# Patient Record
Sex: MALE | Race: WHITE | NOT HISPANIC OR LATINO | Employment: FULL TIME | URBAN - METROPOLITAN AREA
[De-identification: names, ages, dates, MRNs, and addresses within clinical notes are randomized per-mention and may not be internally consistent; named-entity substitution may affect disease eponyms.]

---

## 2017-01-24 ENCOUNTER — APPOINTMENT (OUTPATIENT)
Dept: LAB | Facility: CLINIC | Age: 32
End: 2017-01-24
Payer: COMMERCIAL

## 2017-01-24 ENCOUNTER — TRANSCRIBE ORDERS (OUTPATIENT)
Dept: LAB | Facility: CLINIC | Age: 32
End: 2017-01-24

## 2017-01-24 DIAGNOSIS — Z79.899 ENCOUNTER FOR LONG-TERM (CURRENT) USE OF OTHER MEDICATIONS: ICD-10-CM

## 2017-01-24 DIAGNOSIS — Z79.899 ENCOUNTER FOR LONG-TERM (CURRENT) USE OF OTHER MEDICATIONS: Primary | ICD-10-CM

## 2017-01-24 LAB
ALBUMIN SERPL BCP-MCNC: 3.9 G/DL (ref 3.5–5)
ALP SERPL-CCNC: 84 U/L (ref 46–116)
ALT SERPL W P-5'-P-CCNC: 40 U/L (ref 12–78)
AST SERPL W P-5'-P-CCNC: 15 U/L (ref 5–45)
BILIRUB DIRECT SERPL-MCNC: 0.1 MG/DL (ref 0–0.2)
BILIRUB SERPL-MCNC: 0.4 MG/DL (ref 0.2–1)
PROT SERPL-MCNC: 7 G/DL (ref 6.4–8.2)

## 2017-01-24 PROCEDURE — 80076 HEPATIC FUNCTION PANEL: CPT

## 2017-01-24 PROCEDURE — 36415 COLL VENOUS BLD VENIPUNCTURE: CPT

## 2017-03-02 DIAGNOSIS — M51.36 OTHER INTERVERTEBRAL DISC DEGENERATION, LUMBAR REGION: ICD-10-CM

## 2017-03-02 DIAGNOSIS — M54.50 LOW BACK PAIN: ICD-10-CM

## 2017-03-02 DIAGNOSIS — M51.26 OTHER INTERVERTEBRAL DISC DISPLACEMENT, LUMBAR REGION: ICD-10-CM

## 2017-03-21 ENCOUNTER — TRANSCRIBE ORDERS (OUTPATIENT)
Dept: ADMINISTRATIVE | Facility: HOSPITAL | Age: 32
End: 2017-03-21

## 2017-03-21 DIAGNOSIS — M54.5 LOW BACK PAIN, UNSPECIFIED BACK PAIN LATERALITY, UNSPECIFIED CHRONICITY, WITH SCIATICA PRESENCE UNSPECIFIED: Primary | ICD-10-CM

## 2017-03-21 DIAGNOSIS — M51.26 DISPLACEMENT OF LUMBAR INTERVERTEBRAL DISC WITHOUT MYELOPATHY: ICD-10-CM

## 2017-03-21 DIAGNOSIS — M51.36 DEGENERATION OF LUMBAR INTERVERTEBRAL DISC: ICD-10-CM

## 2017-04-06 ENCOUNTER — HOSPITAL ENCOUNTER (OUTPATIENT)
Dept: RADIOLOGY | Facility: HOSPITAL | Age: 32
Discharge: HOME | End: 2017-04-06
Attending: NEUROLOGICAL SURGERY | Admitting: RADIOLOGY
Payer: COMMERCIAL

## 2017-04-06 DIAGNOSIS — M51.36 OTHER INTERVERTEBRAL DISC DEGENERATION, LUMBAR REGION: ICD-10-CM

## 2017-04-06 DIAGNOSIS — M54.50 LOW BACK PAIN: ICD-10-CM

## 2017-04-06 DIAGNOSIS — M51.26 OTHER INTERVERTEBRAL DISC DISPLACEMENT, LUMBAR REGION: ICD-10-CM

## 2017-04-06 PROCEDURE — 64483 NJX AA&/STRD TFRM EPI L/S 1: CPT

## 2017-08-15 DIAGNOSIS — M51.26 OTHER INTERVERTEBRAL DISC DISPLACEMENT, LUMBAR REGION: ICD-10-CM

## 2017-09-14 ENCOUNTER — HOSPITAL ENCOUNTER (OUTPATIENT)
Dept: RADIOLOGY | Facility: HOSPITAL | Age: 32
Discharge: HOME | End: 2017-09-14
Attending: NEUROLOGICAL SURGERY
Payer: COMMERCIAL

## 2017-09-14 DIAGNOSIS — M51.26 DISPLACEMENT OF LUMBAR INTERVERTEBRAL DISC WITHOUT MYELOPATHY: ICD-10-CM

## 2017-09-14 DIAGNOSIS — M54.5 LOW BACK PAIN, UNSPECIFIED BACK PAIN LATERALITY, UNSPECIFIED CHRONICITY, WITH SCIATICA PRESENCE UNSPECIFIED: ICD-10-CM

## 2017-09-14 DIAGNOSIS — M51.36 DEGENERATION OF LUMBAR INTERVERTEBRAL DISC: ICD-10-CM

## 2017-09-14 PROCEDURE — 62323 NJX INTERLAMINAR LMBR/SAC: CPT

## 2017-09-14 RX ORDER — METHYLPREDNISOLONE ACETATE 80 MG/ML
80 INJECTION, SUSPENSION INTRA-ARTICULAR; INTRALESIONAL; INTRAMUSCULAR; SOFT TISSUE ONCE
Status: COMPLETED | OUTPATIENT
Start: 2017-09-14 | End: 2017-09-14

## 2017-09-14 RX ORDER — BUPIVACAINE HYDROCHLORIDE 2.5 MG/ML
2 INJECTION, SOLUTION EPIDURAL; INFILTRATION; INTRACAUDAL ONCE
Status: COMPLETED | OUTPATIENT
Start: 2017-09-14 | End: 2017-09-14

## 2017-09-14 RX ADMIN — METHYLPREDNISOLONE ACETATE 80 MG: 80 INJECTION, SUSPENSION INTRA-ARTICULAR; INTRALESIONAL; INTRAMUSCULAR; SOFT TISSUE at 11:50

## 2017-09-14 RX ADMIN — BUPIVACAINE HYDROCHLORIDE 2 ML: 2.5 INJECTION, SOLUTION EPIDURAL; INFILTRATION; INTRACAUDAL at 11:50

## 2017-12-19 DIAGNOSIS — M54.50 LOW BACK PAIN: ICD-10-CM

## 2017-12-19 DIAGNOSIS — M51.36 OTHER INTERVERTEBRAL DISC DEGENERATION, LUMBAR REGION: ICD-10-CM

## 2017-12-29 ENCOUNTER — GENERIC CONVERSION - ENCOUNTER (OUTPATIENT)
Dept: OTHER | Facility: OTHER | Age: 32
End: 2017-12-29

## 2018-01-02 ENCOUNTER — GENERIC CONVERSION - ENCOUNTER (OUTPATIENT)
Dept: NEUROSURGERY | Facility: MEDICAL CENTER | Age: 33
End: 2018-01-02

## 2018-01-02 ENCOUNTER — APPOINTMENT (OUTPATIENT)
Dept: PHYSICAL THERAPY | Facility: MEDICAL CENTER | Age: 33
End: 2018-01-02
Payer: COMMERCIAL

## 2018-01-02 PROCEDURE — G8990 OTHER PT/OT CURRENT STATUS: HCPCS

## 2018-01-02 PROCEDURE — 97161 PT EVAL LOW COMPLEX 20 MIN: CPT

## 2018-01-02 PROCEDURE — G8991 OTHER PT/OT GOAL STATUS: HCPCS

## 2018-01-04 ENCOUNTER — APPOINTMENT (OUTPATIENT)
Dept: PHYSICAL THERAPY | Facility: MEDICAL CENTER | Age: 33
End: 2018-01-04
Payer: COMMERCIAL

## 2018-01-04 PROCEDURE — 97140 MANUAL THERAPY 1/> REGIONS: CPT

## 2018-01-04 PROCEDURE — 97110 THERAPEUTIC EXERCISES: CPT

## 2018-01-16 ENCOUNTER — APPOINTMENT (OUTPATIENT)
Dept: PHYSICAL THERAPY | Facility: MEDICAL CENTER | Age: 33
End: 2018-01-16
Payer: COMMERCIAL

## 2018-01-16 PROCEDURE — 97140 MANUAL THERAPY 1/> REGIONS: CPT

## 2018-01-16 PROCEDURE — 97110 THERAPEUTIC EXERCISES: CPT

## 2018-01-18 ENCOUNTER — APPOINTMENT (OUTPATIENT)
Dept: PHYSICAL THERAPY | Facility: MEDICAL CENTER | Age: 33
End: 2018-01-18
Payer: COMMERCIAL

## 2018-01-18 PROCEDURE — 97110 THERAPEUTIC EXERCISES: CPT

## 2018-01-23 ENCOUNTER — APPOINTMENT (OUTPATIENT)
Dept: PHYSICAL THERAPY | Facility: MEDICAL CENTER | Age: 33
End: 2018-01-23
Payer: COMMERCIAL

## 2018-01-23 PROCEDURE — 97110 THERAPEUTIC EXERCISES: CPT

## 2018-01-23 PROCEDURE — 97140 MANUAL THERAPY 1/> REGIONS: CPT

## 2018-01-23 NOTE — MISCELLANEOUS
Message   Recorded as Task   Date: 12/28/2017 11:47 AM, Created By: Ottoniel Lugo   Task Name: Care Coordination   Assigned To: Ottoniel Lugo   Regarding Patient: Maxwell Juarez, Status: Active   Comment:    Betty Michael - 28 Dec 2017 11:47 AM     TASK CREATED   Betty Michael - 28 Dec 2017 11:51 [place] December EDITED  Dr Justin Hook called re: this pt and his symptoms  Spoke with pt directly who reports he was provided with a Medrol dose riccardo which was ineffective  He is experiencing increasing L leg numbness into thigh and weakness  Please advise, or to speak directly to him cell [phone]    Thank You   Nichol Conte - 29 Dec 2017 10:59 AM     TASK REPLIED TO: Previously Assigned To Kylie Carpenter  i have contact patient   Betty Michael - 29 Dec 2017 11:20 AM     TASK REASSIGNED: Previously Assigned To Rupal Soares   Electronically signed by : Bonita Dale, ; Dec 29 2017 11:20AM EST                       (Author)

## 2018-01-25 ENCOUNTER — APPOINTMENT (OUTPATIENT)
Dept: PHYSICAL THERAPY | Facility: MEDICAL CENTER | Age: 33
End: 2018-01-25
Payer: COMMERCIAL

## 2018-02-01 ENCOUNTER — APPOINTMENT (OUTPATIENT)
Dept: PHYSICAL THERAPY | Facility: MEDICAL CENTER | Age: 33
End: 2018-02-01
Payer: COMMERCIAL

## 2018-02-05 ENCOUNTER — OFFICE VISIT (OUTPATIENT)
Dept: PHYSICAL THERAPY | Facility: MEDICAL CENTER | Age: 33
End: 2018-02-05
Payer: COMMERCIAL

## 2018-02-05 DIAGNOSIS — G89.29 CHRONIC MIDLINE LOW BACK PAIN WITHOUT SCIATICA: Primary | ICD-10-CM

## 2018-02-05 DIAGNOSIS — M54.50 CHRONIC MIDLINE LOW BACK PAIN WITHOUT SCIATICA: Primary | ICD-10-CM

## 2018-02-05 PROCEDURE — 97110 THERAPEUTIC EXERCISES: CPT | Performed by: PHYSICAL THERAPIST

## 2018-02-05 PROCEDURE — 97140 MANUAL THERAPY 1/> REGIONS: CPT | Performed by: PHYSICAL THERAPIST

## 2018-02-05 NOTE — PROGRESS NOTES
Daily Note     Today's date: 2018  Patient name: Justina Gonzalez  : 1985  MRN: [id number]  Referring provider: Litzy Babin MD  Dx:   Encounter Diagnosis   Name Primary?  Chronic midline low back pain without sciatica Yes                  Subjective: patient states that overall he is seeing signs of improvement  He continues to have less discomfort while performing gym routine and work tasks  Patient notes that his stiffness persists  Objective: See treatment diary below  Precautions: none    Daily Treatment Diary     Manual  2/5                                                                                 Exercise Diary  2/5            Elliptical  10min            Hamstring stretch 30"x3            Hip flexor stretch 30"x3            Lunge matrix stretch 10sx3            Foam roller 10min                                                                                                                                                                                                                   Modalities                                                               Assessment: Tolerated treatment well  Patient demonstrated fatigue post treatment, exhibited good technique with therapeutic exercises and would benefit from continued PT  Focus on foam roller and manual therapy      Plan: Continue per plan of care

## 2018-02-08 ENCOUNTER — OFFICE VISIT (OUTPATIENT)
Dept: PHYSICAL THERAPY | Facility: MEDICAL CENTER | Age: 33
End: 2018-02-08
Payer: COMMERCIAL

## 2018-02-08 DIAGNOSIS — G89.29 CHRONIC MIDLINE LOW BACK PAIN WITHOUT SCIATICA: Primary | ICD-10-CM

## 2018-02-08 DIAGNOSIS — M54.50 CHRONIC MIDLINE LOW BACK PAIN WITHOUT SCIATICA: Primary | ICD-10-CM

## 2018-02-08 PROCEDURE — 97110 THERAPEUTIC EXERCISES: CPT | Performed by: PHYSICAL THERAPIST

## 2018-02-08 NOTE — PROGRESS NOTES
Daily Note     Today's date: 2018  Patient name: Letitia Gooden  : 1985  MRN: [id number]  Referring provider: Ken Corbin MD  Dx:   Encounter Diagnosis   Name Primary?  Chronic midline low back pain without sciatica Yes                  Subjective: patient states that overall he is seeing signs of improvement  He continues to have less discomfort while performing gym routine and work tasks  Patient notes that his stiffness persists and some days it is better than others  Objective: See treatment diary below  Precautions: none    Daily Treatment Diary     Manual                                                                                  Exercise Diary             Elliptical  10min 10m           Hamstring stretch 30"x3 30"x3           Hip flexor stretch 30"x3 30"x3           Lunge matrix stretch 10sx3 30"x3           Foam roller 10min 10           Lateral glide on wall   2x10           Prayer stretch  30"x3                                                                                                                                                                                        Modalities                                                               Assessment: Tolerated treatment well  Patient demonstrated fatigue post treatment, exhibited good technique with therapeutic exercises and would benefit from continued PT  Focus on foam roller and manual therapy      Plan: Continue per plan of care

## 2018-02-12 ENCOUNTER — OFFICE VISIT (OUTPATIENT)
Dept: PHYSICAL THERAPY | Facility: MEDICAL CENTER | Age: 33
End: 2018-02-12
Payer: COMMERCIAL

## 2018-02-12 DIAGNOSIS — M54.50 CHRONIC MIDLINE LOW BACK PAIN WITHOUT SCIATICA: Primary | ICD-10-CM

## 2018-02-12 DIAGNOSIS — G89.29 CHRONIC MIDLINE LOW BACK PAIN WITHOUT SCIATICA: Primary | ICD-10-CM

## 2018-02-12 PROCEDURE — 97140 MANUAL THERAPY 1/> REGIONS: CPT | Performed by: PHYSICAL THERAPIST

## 2018-02-12 PROCEDURE — 97110 THERAPEUTIC EXERCISES: CPT | Performed by: PHYSICAL THERAPIST

## 2018-02-12 NOTE — PROGRESS NOTES
Daily Note     Today's date: 2018  Patient name: Lima Coulter  : 1985  MRN: [id number]  Referring provider: Mone Pérez MD  Dx:   Encounter Diagnosis   Name Primary?  Chronic midline low back pain without sciatica Yes                  Subjective: patient states that overall he is seeing signs of improvement  He continues to have less discomfort while performing gym routine and work tasks  Patient notes that his stiffness persists and some days it is better than others but always in the morning  Objective: See treatment diary below  Precautions: none    Daily Treatment Diary     Manual                                                                                 Exercise Diary             Elliptical  10min 10m 10          Hamstring stretch 30"x3 30"x3           Hip flexor stretch 30"x3 30"x3 30"x3          Lunge matrix stretch 10sx3 30"x3 30"x3          Foam roller 10min 10 10          Lateral glide on wall   2x10 30"x3          Prayer stretch  30"x3 30"x3                                                                                                                                                                                       Modalities                                                               Assessment: Tolerated treatment well  Patient demonstrated fatigue post treatment, exhibited good technique with therapeutic exercises and would benefit from continued PT  Focus on foam roller and manual therapy      Plan: Continue per plan of care

## 2018-02-15 ENCOUNTER — APPOINTMENT (OUTPATIENT)
Dept: PHYSICAL THERAPY | Facility: MEDICAL CENTER | Age: 33
End: 2018-02-15
Payer: COMMERCIAL

## 2018-02-22 ENCOUNTER — OFFICE VISIT (OUTPATIENT)
Dept: PHYSICAL THERAPY | Facility: MEDICAL CENTER | Age: 33
End: 2018-02-22
Payer: COMMERCIAL

## 2018-02-22 DIAGNOSIS — G89.29 CHRONIC MIDLINE LOW BACK PAIN WITHOUT SCIATICA: Primary | ICD-10-CM

## 2018-02-22 DIAGNOSIS — M54.50 CHRONIC MIDLINE LOW BACK PAIN WITHOUT SCIATICA: Primary | ICD-10-CM

## 2018-02-22 PROCEDURE — 97110 THERAPEUTIC EXERCISES: CPT | Performed by: PHYSICAL THERAPIST

## 2018-02-22 NOTE — PROGRESS NOTES
Daily Note     Today's date: 2018  Patient name: Kervin Doe  : 1985  MRN: [id number]  Referring provider: Sandy Price MD  Dx:   Encounter Diagnosis   Name Primary?  Chronic midline low back pain without sciatica Yes                  Subjective: patient states that overall he is seeing signs of improvement  He continues to have less discomfort while performing gym routine and work tasks  Patient notes that his stiffness persists and some days it is better than others but always in the morning  Overall back to base line  Objective: See treatment diary below  Precautions: none    Daily Treatment Diary     Manual                                                                                Exercise Diary             Elliptical  10min 10m 10 10         Hamstring stretch 30"x3 30"x3  10x5         Hip flexor stretch 30"x3 30"x3 30"x3 10x5         Lunge matrix stretch 10sx3 30"x3 30"x3 10x5         Foam roller 10min 10 10 10         Lateral glide on wall   2x10 30"x3 10x5         Prayer stretch  30"x3 30"x3 10x5         Lumbar rotation stretch    10x5                                                                                                                                                                         Modalities                                                               Assessment: Tolerated treatment well  Patient demonstrated fatigue post treatment, exhibited good technique with therapeutic exercises and would benefit from continued PT  Focus on foam roller and manual therapy      Plan: Continue per plan of care

## 2018-02-27 ENCOUNTER — OFFICE VISIT (OUTPATIENT)
Dept: NEUROSURGERY | Facility: CLINIC | Age: 33
End: 2018-02-27
Payer: COMMERCIAL

## 2018-02-27 VITALS
BODY MASS INDEX: 28.29 KG/M2 | HEART RATE: 72 BPM | HEIGHT: 69 IN | DIASTOLIC BLOOD PRESSURE: 80 MMHG | RESPIRATION RATE: 16 BRPM | TEMPERATURE: 96.7 F | WEIGHT: 191 LBS | SYSTOLIC BLOOD PRESSURE: 130 MMHG

## 2018-02-27 DIAGNOSIS — M54.16 LUMBAR RADICULOPATHY: Primary | ICD-10-CM

## 2018-02-27 DIAGNOSIS — M51.9 LUMBAR DISC DISEASE: ICD-10-CM

## 2018-02-27 PROCEDURE — 99214 OFFICE O/P EST MOD 30 MIN: CPT | Performed by: NEUROLOGICAL SURGERY

## 2018-02-27 RX ORDER — DIPHENOXYLATE HYDROCHLORIDE AND ATROPINE SULFATE 2.5; .025 MG/1; MG/1
1 TABLET ORAL DAILY
COMMUNITY

## 2018-02-27 RX ORDER — NAPROXEN 500 MG/1
1 TABLET ORAL AS NEEDED
COMMUNITY
End: 2021-09-29

## 2018-02-27 RX ORDER — OMEPRAZOLE 40 MG/1
40 CAPSULE, DELAYED RELEASE ORAL DAILY
Refills: 3 | COMMUNITY
Start: 2018-01-05 | End: 2018-08-01 | Stop reason: CLARIF

## 2018-02-27 RX ORDER — CETIRIZINE HYDROCHLORIDE 10 MG/1
10 TABLET ORAL DAILY
COMMUNITY

## 2018-02-27 RX ORDER — ALBUTEROL SULFATE 90 UG/1
AEROSOL, METERED RESPIRATORY (INHALATION) AS NEEDED
COMMUNITY
End: 2020-02-06 | Stop reason: SDUPTHER

## 2018-02-27 NOTE — PROGRESS NOTES
Assessment/Plan:    No problem-specific Assessment & Plan notes found for this encounter  Problem List Items Addressed This Visit     None      Visit Diagnoses     Lumbar radiculopathy    -  Primary    Relevant Orders    MRI lumbar spine without contrast    Lumbar disc disease        Relevant Orders    MRI lumbar spine without contrast            Subjective:      Patient ID: Baron Kennedy is a 27 y o  male  HPI      Review of Systems   HENT: Negative  Eyes: Negative  Respiratory: Negative  Cardiovascular: Negative  Gastrointestinal: Negative  Endocrine: Negative  Genitourinary: Negative  Musculoskeletal: Positive for back pain (uncomfortable)  Skin: Negative  Allergic/Immunologic: Negative  Neurological: Positive for weakness (left leg), numbness (left leg) and headaches  Negative for dizziness, seizures and syncope  Hematological: Negative  Psychiatric/Behavioral: Positive for sleep disturbance (due to pain and numbness)  Negative for confusion  Objective: There were no vitals taken for this visit  HPI    Patient known to service  Chronic LBP with new leg pain x 6 months  PT, DYANA and nsaids of some benefit  No recent MRI x 9 yrs  Denies weakness, bowel or bladder deficit  Physical Exam      Moderate limitation in lumbar ROM  Severe paravertebral spasm  Full strength bilateral LE  Negative SLR  Intact sensation and DTR  Mildly antalgic gait  Intact fine motor and coordination    Radiology    No recent studies x 9 yrs  Known L4/5 left paracentral disc herniation with mild-moderate adjacent level degeneration    Summary and Plan    Mr Darlene Rebollar has acute on chronic back pain with new leg pain  We reviewed the conservative options including PT, DYANA and medications  He has been pursuing these avenues as prescribed for the past 6 months  Given his new symptoms, I have ordered a repeat MRI lumbar spine  I will see him in 6 weeks fu

## 2018-03-15 ENCOUNTER — HOSPITAL ENCOUNTER (OUTPATIENT)
Dept: RADIOLOGY | Facility: HOSPITAL | Age: 33
Discharge: HOME | End: 2018-03-15
Attending: NEUROLOGICAL SURGERY
Payer: COMMERCIAL

## 2018-03-15 DIAGNOSIS — M54.16 LUMBAR RADICULOPATHY: ICD-10-CM

## 2018-03-15 DIAGNOSIS — M51.9 LUMBAR DISC DISEASE: ICD-10-CM

## 2018-03-15 PROCEDURE — 72148 MRI LUMBAR SPINE W/O DYE: CPT

## 2018-08-01 ENCOUNTER — OFFICE VISIT (OUTPATIENT)
Dept: FAMILY MEDICINE CLINIC | Facility: CLINIC | Age: 33
End: 2018-08-01
Payer: COMMERCIAL

## 2018-08-01 ENCOUNTER — APPOINTMENT (OUTPATIENT)
Dept: LAB | Facility: CLINIC | Age: 33
End: 2018-08-01
Payer: COMMERCIAL

## 2018-08-01 ENCOUNTER — TELEPHONE (OUTPATIENT)
Dept: FAMILY MEDICINE CLINIC | Facility: CLINIC | Age: 33
End: 2018-08-01

## 2018-08-01 VITALS
BODY MASS INDEX: 27.88 KG/M2 | SYSTOLIC BLOOD PRESSURE: 110 MMHG | HEART RATE: 70 BPM | DIASTOLIC BLOOD PRESSURE: 70 MMHG | TEMPERATURE: 96.7 F | WEIGHT: 188.2 LBS | HEIGHT: 69 IN | RESPIRATION RATE: 16 BRPM

## 2018-08-01 DIAGNOSIS — L65.9 ALOPECIA: ICD-10-CM

## 2018-08-01 DIAGNOSIS — Z00.00 PHYSICAL EXAM: Primary | ICD-10-CM

## 2018-08-01 LAB
PSA SERPL-MCNC: 0.6 NG/ML (ref 0–4)
SL AMB  POCT GLUCOSE, UA: ABNORMAL
SL AMB LEUKOCYTE ESTERASE,UA: ABNORMAL
SL AMB POCT BILIRUBIN,UA: ABNORMAL
SL AMB POCT BLOOD,UA: ABNORMAL
SL AMB POCT CLARITY,UA: CLEAR
SL AMB POCT COLOR,UA: YELLOW
SL AMB POCT KETONES,UA: ABNORMAL
SL AMB POCT NITRITE,UA: ABNORMAL
SL AMB POCT PH,UA: 6.5
SL AMB POCT SPECIFIC GRAVITY,UA: 1.01
SL AMB POCT URINE PROTEIN: ABNORMAL
SL AMB POCT UROBILINOGEN: 0.2

## 2018-08-01 PROCEDURE — G0103 PSA SCREENING: HCPCS

## 2018-08-01 PROCEDURE — 36415 COLL VENOUS BLD VENIPUNCTURE: CPT

## 2018-08-01 PROCEDURE — 99385 PREV VISIT NEW AGE 18-39: CPT | Performed by: FAMILY MEDICINE

## 2018-08-01 PROCEDURE — 81002 URINALYSIS NONAUTO W/O SCOPE: CPT | Performed by: FAMILY MEDICINE

## 2018-08-01 RX ORDER — RANITIDINE 150 MG/1
150 TABLET ORAL 2 TIMES DAILY
COMMUNITY

## 2018-08-01 RX ORDER — FINASTERIDE 1 MG/1
1 TABLET, FILM COATED ORAL DAILY
Qty: 90 TABLET | Refills: 3 | Status: SHIPPED | OUTPATIENT
Start: 2018-08-01 | End: 2019-07-23 | Stop reason: SDUPTHER

## 2018-08-01 NOTE — PROGRESS NOTES
FAMILY PRACTICE OFFICE VISIT       NAME: Radha Omer  AGE: 27 y o  SEX: male       : 1985        MRN: [id number]    DATE: 2018  TIME: 12:55 PM    Assessment and Plan     Problem List Items Addressed This Visit     Physical exam - Primary    Relevant Orders    POCT urine dip (Completed)    Alopecia    Relevant Medications    finasteride (PROPECIA) 1 MG tablet    Other Relevant Orders    PSA, Total Screen (Completed)        Patient was given prescription to obtain PSA blood tests for baseline level  He was prescription for Propecia to initiate 1 milligram daily as directed  Overall patient appears to be in stable health    There are no Patient Instructions on file for this visit  Chief Complaint     Chief Complaint   Patient presents with   [address]     Patient is here to establish care  History of Present Illness     Patient is a nurse anesthetist for [employer]   He exercises 5 days a week  He is a nonsmoker  He denies any recent illness  Does not take any over-the-counter medications  He does have concerns of standing hair loss on the top of his head  There is no strong family history of hair loss  Patient wished to initiate Propecia medication  Patient did have blood test last year for insurance purposes  He will bring a copy of his blood test results which she states were all within normal limits        Review of Systems   Review of Systems   Constitutional: Negative  HENT: Negative  Respiratory: Negative  Cardiovascular: Negative  Gastrointestinal: Negative  Genitourinary: Negative  Musculoskeletal: Negative  Skin:        As per HPI   Neurological: Negative          Active Problem List     Patient Active Problem List   Diagnosis    Chronic low back pain    Disc degeneration, lumbar    Lumbar disc herniation    Physical exam    Alopecia       Past Medical History:  Past Medical History:   Diagnosis Date    Asthma     Esophageal reflux        Past Surgical History:  Past Surgical History:   Procedure Laterality Date    TOOTH EXTRACTION         Family History:  Family History   Problem Relation Age of Onset    No Known Problems Mother     Hyperlipidemia Father     Hypertension Father     Cancer Maternal Grandfather     Hypertension Paternal Grandmother     Hypertension Paternal Grandfather        Social History:  Social History     Social History    Marital status: Single     Spouse name: N/A    Number of children: N/A    Years of education: N/A     Occupational History    Not on file  Social History Main Topics    Smoking status: Never Smoker    Smokeless tobacco: Never Used    Alcohol use Yes      Comment: social    Drug use: No    Sexual activity: Yes     Other Topics Concern    Not on file     Social History Narrative    No narrative on file       Objective     Vitals:    08/01/18 0925   BP: 110/70   Pulse: 70   Resp: 16   Temp: (!) 96 7 °F (35 9 °C)     Wt Readings from Last 3 Encounters:   08/01/18 85 4 kg (188 lb 3 2 oz)   03/15/18 83 9 kg (185 lb)   02/27/18 86 6 kg (191 lb)       Physical Exam   Constitutional: He is oriented to person, place, and time  No distress  HENT:   Mouth/Throat: Oropharynx is clear and moist  No oropharyngeal exudate  Tympanic membranes within normal limits bilaterally   Neck:   No carotid bruits   Cardiovascular:   Regular rate and rhythm with no murmurs   Pulmonary/Chest:   Lungs are clear to auscultation without wheezes,rales, or rhonchi   Abdominal:   Abdomen is soft, nontender with positive bowel sounds  There is no rebound or guarding  No masses palpated   Musculoskeletal: He exhibits no edema  Lymphadenopathy:     He has no cervical adenopathy  Neurological: He is alert and oriented to person, place, and time  Skin:   Patient with thinning hair on the crown of his head    No rash noted on skin       Pertinent Laboratory/Diagnostic Studies:  Lab Results   Component Value Date    GLUCOSE 87 07/01/2015    BUN 20 07/01/2015    CREATININE 1 17 07/01/2015    CALCIUM 8 9 07/01/2015     07/01/2015    K 4 1 07/01/2015    CO2 31 07/01/2015     07/01/2015     Lab Results   Component Value Date    ALT 40 01/24/2017    AST 15 01/24/2017    ALKPHOS 84 01/24/2017    BILITOT 0 40 01/24/2017       Lab Results   Component Value Date    WBC 6 08 07/01/2015    HGB 14 6 07/01/2015    HCT 41 9 07/01/2015    MCV 85 07/01/2015     07/01/2015       No results found for: TSH    Lab Results   Component Value Date    CHOL 120 07/01/2015     Lab Results   Component Value Date    TRIG 71 07/01/2015     Lab Results   Component Value Date    HDL 42 07/01/2015     Lab Results   Component Value Date    LDLCALC 64 07/01/2015     No results found for: HGBA1C    Results for orders placed or performed in visit on 08/01/18   POCT urine dip   Result Value Ref Range    LEUKOCYTE ESTERASE,UA -      NITRITE,UA -     SL AMB POCT UROBILINOGEN 0 2     SL AMB POCT URINE PROTEIN -      PH,UA 6 5      BLOOD,UA -      SPECIFIC GRAVITY,UA 1 015      KETONES,UA -      BILIRUBIN,UA -     GLUCOSE, UA -      COLOR,UA Yellow      CLARITY,UA Clear        Orders Placed This Encounter   Procedures    PSA, Total Screen    POCT urine dip       ALLERGIES:  No Known Allergies    Current Medications     Current Outpatient Prescriptions   Medication Sig Dispense Refill    albuterol (PROVENTIL HFA,VENTOLIN HFA) 90 mcg/act inhaler Inhale as needed      cetirizine (ZYRTEC ALLERGY) 10 mg tablet Take 10 mg by mouth daily      multivitamin (THERAGRAN) TABS Take 1 tablet by mouth daily      naproxen (NAPROSYN) 500 mg tablet Take 1 tablet by mouth as needed      ranitidine (ZANTAC) 150 mg tablet Take 150 mg by mouth 2 (two) times a day      finasteride (PROPECIA) 1 MG tablet Take 1 tablet (1 mg total) by mouth daily 90 tablet 3     No current facility-administered medications for this visit            Health Maintenance Health Maintenance   Topic Date Due    Depression Screening PHQ-9  1985    PT PLAN OF CARE  1985    PNEUMOCOCCAL POLYSACCHARIDE VACCINE AGE 2-64 HIGH RISK  10/12/1985    DTaP,Tdap,and Td Vaccines (2 - Tdap) 10/12/2004    HIV SCREENING  07/01/2018    INFLUENZA VACCINE  09/01/2018     Immunization History   Administered Date(s) Administered    Meningococcal Polysaccharide (MPSV4) 07/17/2002    Td (adult), adsorbed 38/21/3971       Linda Zambrano MD

## 2018-12-07 ENCOUNTER — TELEPHONE (OUTPATIENT)
Dept: FAMILY MEDICINE CLINIC | Facility: CLINIC | Age: 33
End: 2018-12-07

## 2018-12-07 DIAGNOSIS — M51.26 LUMBAR DISC HERNIATION: Primary | ICD-10-CM

## 2018-12-07 DIAGNOSIS — M54.50 LOW BACK PAIN WITHOUT SCIATICA, UNSPECIFIED BACK PAIN LATERALITY, UNSPECIFIED CHRONICITY: Primary | ICD-10-CM

## 2018-12-07 RX ORDER — METHYLPREDNISOLONE 4 MG/1
TABLET ORAL
Qty: 21 TABLET | Refills: 0 | Status: SHIPPED | OUTPATIENT
Start: 2018-12-07 | End: 2018-12-19

## 2018-12-07 RX ORDER — CYCLOBENZAPRINE HCL 10 MG
10 TABLET ORAL 3 TIMES DAILY PRN
Qty: 30 TABLET | Refills: 0 | Status: SHIPPED | OUTPATIENT
Start: 2018-12-07 | End: 2018-12-19

## 2018-12-07 NOTE — TELEPHONE ENCOUNTER
He would need to be seen by someone either at our office, [place] , or any other [id number] [facility] before medication would be able to be prescribed

## 2018-12-07 NOTE — TELEPHONE ENCOUNTER
Eric Pratherira is having a flare up for his chronic back issue  Can you prescribe something or do you need to see him

## 2018-12-19 ENCOUNTER — CONSULT (OUTPATIENT)
Dept: PAIN MEDICINE | Facility: CLINIC | Age: 33
End: 2018-12-19
Payer: COMMERCIAL

## 2018-12-19 VITALS
WEIGHT: 189 LBS | RESPIRATION RATE: 18 BRPM | BODY MASS INDEX: 27.06 KG/M2 | DIASTOLIC BLOOD PRESSURE: 66 MMHG | HEIGHT: 70 IN | HEART RATE: 72 BPM | SYSTOLIC BLOOD PRESSURE: 112 MMHG

## 2018-12-19 DIAGNOSIS — M51.16 INTERVERTEBRAL DISC DISORDER WITH RADICULOPATHY OF LUMBAR REGION: Primary | ICD-10-CM

## 2018-12-19 DIAGNOSIS — M79.18 MYOFASCIAL PAIN SYNDROME: ICD-10-CM

## 2018-12-19 PROCEDURE — 99244 OFF/OP CNSLTJ NEW/EST MOD 40: CPT | Performed by: ANESTHESIOLOGY

## 2018-12-19 RX ORDER — METHOCARBAMOL 750 MG/1
750 TABLET, FILM COATED ORAL
Qty: 30 TABLET | Refills: 0 | Status: SHIPPED | OUTPATIENT
Start: 2018-12-19 | End: 2019-11-07 | Stop reason: ALTCHOICE

## 2018-12-19 NOTE — PROGRESS NOTES
Assessment:  1  Intervertebral disc disorder with radiculopathy of lumbar region    2  Myofascial pain syndrome        Plan:  The patient's symptoms, history/physical are consistent with pain that is multifactorial in origin but predominantly the result of his L4-5 disc herniation which is leading to right-sided radicular symptoms into his buttock and hip  At this time, I discussed repeating the epidural steroid injection that provided significant relief for him last year  He was again apprised of the most common risks and would like to proceed  He will be scheduled for right L4-5 transforaminal epidural steroid injection under fluoroscopic guidance  I will have him discontinue the cyclobenzaprine and start him on methocarbamol 750 mg at bedtime  He was apprised of the most common side effects including sleepiness and dizziness  Complete risks and benefits including bleeding, infection, tissue reaction, nerve injury and allergic reaction were discussed  The approach was demonstrated using models and literature was provided  Verbal and written consent was obtained  My impressions and treatment recommendations were discussed in detail with the patient who verbalized understanding and had no further questions  Discharge instructions were provided  I personally saw and examined the patient and I agree with the above discussed plan of care  Orders Placed This Encounter   Procedures    FL spine and pain procedure     Standing Status:   Future     Standing Expiration Date:   12/19/2022     Order Specific Question:   Reason for Exam:     Answer:   Right L4-5 TF DYANA     Order Specific Question:   Anticoagulant hold needed?      Answer:   No     New Medications Ordered This Visit   Medications    methocarbamol (ROBAXIN) 750 mg tablet     Sig: Take 1 tablet (750 mg total) by mouth daily at bedtime as needed for muscle spasms for up to 90 days     Dispense:  30 tablet     Refill:  0       History of Present Illness:    Alicia Hoyt is a 26 y o  male who presents for consultation in regards to lower back and right-sided hip pain  Symptoms have been present since 2008 following an athletic injury  Symptoms became recently aggravated about 2 weeks ago and he is currently reporting pain love is moderate to severe rated 8/10 on numeric rating scale and felt nearly constantly worse in the morning and at nighttime  Symptoms are described to be sharp, dull, aching and shooting in the lower back and flank areas into the hip  Pain is aggravated standing, bending, sitting, exercise, coughing, sneezing  There is no change with relaxation or bowel movements  Treatment history has included traction, physical therapy and exercise which has provided moderate relief  He has had epidural steroid injections performed in interventional Radiology which have provided moderate relief  His last injection was in September 2017 which provided significant relief  Use of a TENS unit has provided no relief  He is currently on Flexeril 10 mg at nighttime which provides minimal relief  He was placed on a Medrol Dosepak last week which provided minimal relief  I have personally reviewed and/or updated the patient's past medical history, past surgical history, family history, social history, current medications, allergies, and vital signs today  Review of Systems:    Review of Systems   Constitutional: Negative for fever and unexpected weight change  HENT: Negative for trouble swallowing  Eyes: Negative for visual disturbance  Respiratory: Negative for shortness of breath and wheezing  Cardiovascular: Negative for chest pain and palpitations  Gastrointestinal: Negative for constipation, diarrhea, nausea and vomiting  Endocrine: Negative for cold intolerance, heat intolerance and polydipsia  Genitourinary: Negative for difficulty urinating and frequency  Musculoskeletal: Positive for arthralgias   Negative for gait problem, joint swelling and myalgias  DROM   Skin: Negative for rash  Neurological: Negative for dizziness, seizures, syncope, weakness and headaches  Hematological: Does not bruise/bleed easily  Psychiatric/Behavioral: Negative for dysphoric mood  All other systems reviewed and are negative  Patient Active Problem List   Diagnosis    Chronic low back pain    Disc degeneration, lumbar    Lumbar disc herniation    Physical exam    Alopecia       Past Medical History:   Diagnosis Date    Asthma     Esophageal reflux        Past Surgical History:   Procedure Laterality Date    TOOTH EXTRACTION         Family History   Problem Relation Age of Onset    No Known Problems Mother     Hyperlipidemia Father     Hypertension Father     Cancer Maternal Grandfather     Hypertension Paternal Grandmother     Hypertension Paternal Grandfather        Social History     Occupational History    Not on file       Social History Main Topics    Smoking status: Never Smoker    Smokeless tobacco: Never Used    Alcohol use Yes      Comment: social    Drug use: No    Sexual activity: Yes       Current Outpatient Prescriptions on File Prior to Visit   Medication Sig    albuterol (PROVENTIL HFA,VENTOLIN HFA) 90 mcg/act inhaler Inhale as needed    cetirizine (ZYRTEC ALLERGY) 10 mg tablet Take 10 mg by mouth daily    finasteride (PROPECIA) 1 MG tablet Take 1 tablet (1 mg total) by mouth daily (Patient not taking: Reported on 12/19/2018 )    multivitamin (THERAGRAN) TABS Take 1 tablet by mouth daily    naproxen (NAPROSYN) 500 mg tablet Take 1 tablet by mouth as needed    ranitidine (ZANTAC) 150 mg tablet Take 150 mg by mouth 2 (two) times a day    [DISCONTINUED] cyclobenzaprine (FLEXERIL) 10 mg tablet Take 1 tablet (10 mg total) by mouth 3 (three) times a day as needed for muscle spasms    [DISCONTINUED] Methylprednisolone 4 MG TBPK Use as directed on package (Patient not taking: Reported on 12/19/2018 )     No current facility-administered medications on file prior to visit  No Known Allergies    Physical Exam:    /66 (BP Location: Left arm, Patient Position: Sitting)   Pulse 72   Resp 18   Ht 5' 10" (1 778 m)   Wt 85 7 kg (189 lb)   BMI 27 12 kg/m²     Constitutional: normal, well developed, well nourished, alert, in no distress and non-toxic and no overt pain behavior  Eyes: anicteric  HEENT: grossly intact  Neck: supple, symmetric, trachea midline and no masses   Pulmonary:even and unlabored  Cardiovascular:No edema or pitting edema present  Skin:Normal without rashes or lesions and well hydrated  Psychiatric:Mood and affect appropriate  Neurologic:Cranial Nerves II-XII grossly intact  Musculoskeletal:normal     Lumbar Spine Exam  Appearance:  Normal lordosis  Palpation/Tenderness:  right lumbar paraspinal tenderness  Sensory:  no sensory deficits noted  Range of Motion:  Flexion: Moderately limited  with pain  Extension:  No limitation  without pain  Lateral Flexion - Left:  No limitation  without pain  Lateral Flexion - Right:  Moderately limited  with pain  Rotation - Left:  No limitation  without pain  Rotation - Right:  Moderately limited  with pain  Motor Strength:  Left hip flexion:  5/5  Left hip extension:  5/5  Right hip flexion:  5/5  Right hip extension:  5/5  Left knee flexion:  5/5  Left knee extension:  5/5  Right knee flexion:  5/5  Right knee extension:  5/5  Left foot dorsiflexion:  5/5  Left foot plantar flexion:  5/5  Right foot dorsiflexion:  5/5  Right foot plantar flexion:  5/5  Reflexes:  Left Patellar:  2+   Right Patellar:  2+   Left Achilles:  2+   Right Achilles:  2+   Special Tests:  Left Straight Leg Test:  negative  Right Straight Leg Test:  positive  Left Boby's Maneuver:  negative  Right Boby's Maneuver:  negative    Imaging    Lumbar MRI dated 3/15/18:    L1-L2:  Normal      L2-L3:  Small left foraminal protrusion, new since prior study  Disc is in contact with left L2 root      L3-L4:  Moderate very mild acet arthrosis, increasing right foraminal protrusion, correlate for right L3 radiculitis      L4-L5:  Broad-based protrusion extending to the left, overtly stable in appearance  Minor narrowing of the left lateral recess, no evidence for L5 root compression     L5-S1:  Partial sacralization left transverse process    Minor facet arthrosis      IMPRESSION:     New small left L2-3 protrusion without definite root compression       Increasing right L3-4 foraminal protrusion correlate for right L3 radiculitis      Stable left posterolateral L4-5 protrusion, not clearly compressive

## 2018-12-21 ENCOUNTER — HOSPITAL ENCOUNTER (OUTPATIENT)
Dept: RADIOLOGY | Facility: CLINIC | Age: 33
Discharge: HOME | End: 2018-12-21
Attending: ANESTHESIOLOGY | Admitting: ANESTHESIOLOGY
Payer: COMMERCIAL

## 2018-12-21 VITALS
DIASTOLIC BLOOD PRESSURE: 77 MMHG | SYSTOLIC BLOOD PRESSURE: 129 MMHG | RESPIRATION RATE: 18 BRPM | OXYGEN SATURATION: 100 % | HEART RATE: 69 BPM | TEMPERATURE: 98.6 F

## 2018-12-21 DIAGNOSIS — M51.16 INTERVERTEBRAL DISC DISORDER WITH RADICULOPATHY OF LUMBAR REGION: ICD-10-CM

## 2018-12-21 PROCEDURE — 64484 NJX AA&/STRD TFRM EPI L/S EA: CPT | Performed by: ANESTHESIOLOGY

## 2018-12-21 PROCEDURE — 64483 NJX AA&/STRD TFRM EPI L/S 1: CPT | Performed by: ANESTHESIOLOGY

## 2018-12-21 RX ORDER — METHYLPREDNISOLONE ACETATE 80 MG/ML
80 INJECTION, SUSPENSION INTRA-ARTICULAR; INTRALESIONAL; INTRAMUSCULAR; PARENTERAL; SOFT TISSUE ONCE
Status: COMPLETED | OUTPATIENT
Start: 2018-12-21 | End: 2018-12-21

## 2018-12-21 RX ORDER — BUPIVACAINE HCL/PF 2.5 MG/ML
30 VIAL (ML) INJECTION ONCE
Status: COMPLETED | OUTPATIENT
Start: 2018-12-21 | End: 2018-12-21

## 2018-12-21 RX ORDER — 0.9 % SODIUM CHLORIDE 0.9 %
10 VIAL (ML) INJECTION ONCE
Status: COMPLETED | OUTPATIENT
Start: 2018-12-21 | End: 2018-12-21

## 2018-12-21 RX ADMIN — METHYLPREDNISOLONE ACETATE 80 MG: 80 INJECTION, SUSPENSION INTRA-ARTICULAR; INTRALESIONAL; INTRAMUSCULAR; PARENTERAL; SOFT TISSUE at 09:34

## 2018-12-21 RX ADMIN — Medication 4 ML: at 09:30

## 2018-12-21 RX ADMIN — SODIUM CHLORIDE 4 ML: 9 INJECTION, SOLUTION INTRAMUSCULAR; INTRAVENOUS; SUBCUTANEOUS at 09:30

## 2018-12-21 RX ADMIN — IOHEXOL 1 ML: 300 INJECTION, SOLUTION INTRAVENOUS at 09:34

## 2018-12-21 RX ADMIN — Medication 2 ML: at 09:34

## 2018-12-21 NOTE — H&P
History of Present Illness: The patient is a 26 y o  male who presents with complaints of right lower back and leg pain and is here today for right L4-5 transforaminal epidural steroid injection      Patient Active Problem List   Diagnosis    Chronic low back pain    Disc degeneration, lumbar    Lumbar disc herniation    Physical exam    Alopecia    Intervertebral disc disorder with radiculopathy of lumbar region       Past Medical History:   Diagnosis Date    Asthma     Esophageal reflux        Past Surgical History:   Procedure Laterality Date    TOOTH EXTRACTION           Current Outpatient Prescriptions:     albuterol (PROVENTIL HFA,VENTOLIN HFA) 90 mcg/act inhaler, Inhale as needed, Disp: , Rfl:     cetirizine (ZYRTEC ALLERGY) 10 mg tablet, Take 10 mg by mouth daily, Disp: , Rfl:     finasteride (PROPECIA) 1 MG tablet, Take 1 tablet (1 mg total) by mouth daily (Patient not taking: Reported on 12/19/2018 ), Disp: 90 tablet, Rfl: 3    methocarbamol (ROBAXIN) 750 mg tablet, Take 1 tablet (750 mg total) by mouth daily at bedtime as needed for muscle spasms for up to 90 days, Disp: 30 tablet, Rfl: 0    multivitamin (THERAGRAN) TABS, Take 1 tablet by mouth daily, Disp: , Rfl:     naproxen (NAPROSYN) 500 mg tablet, Take 1 tablet by mouth as needed, Disp: , Rfl:     ranitidine (ZANTAC) 150 mg tablet, Take 150 mg by mouth 2 (two) times a day, Disp: , Rfl:     Current Facility-Administered Medications:     bupivacaine (PF) (MARCAINE) 0 25 % injection 30 mL, 30 mL, Epidural, Once, Gracie Lala MD    iohexol (OMNIPAQUE) 300 mg/mL injection 50 mL, 50 mL, Epidural, Once, Gracie Lala MD    lidocaine (PF) (XYLOCAINE-MPF) 2 % injection 5 mL, 5 mL, Infiltration, Once, Gracie Lala MD    methylPREDNISolone acetate (DEPO-MEDROL) injection 80 mg, 80 mg, Epidural, Once, Gracie Lala MD    sodium chloride (PF) 0 9 % injection 10 mL, 10 mL, Infiltration, Once, Gracie Lala MD    No Known Allergies    Physical Exam:   Vitals:    12/21/18 0920   BP: 135/79   Pulse: 74   Resp: 20   Temp: 98 6 °F (37 °C)   SpO2: 100%     General: Awake, Alert, Oriented x 3, Mood and affect appropriate  Respiratory: Respirations even and unlabored  Cardiovascular: Peripheral pulses intact; no edema  Musculoskeletal Exam:   Right lower back tenderness    ASA Score: 1    Patient/Chart Verification  Patient ID Verified: Verbal  ID Band Applied: No  Consents Confirmed: Procedural, To be obtained in the Pre-Procedure area  H&P( within 30 days) Verified: To be obtained in the Pre-Procedure area  Allergies Reviewed: Yes  Anticoag/NSAID held?: No  Currently on antibiotics?: No    Assessment:   1   Intervertebral disc disorder with radiculopathy of lumbar region        Plan: Right L4-5 TF DYANA

## 2018-12-21 NOTE — DISCHARGE INSTR - LAB
Epidural Steroid Injection   WHAT YOU NEED TO KNOW:   An epidural steroid injection (DYANA) is a procedure to inject steroid medicine into the epidural space  The epidural space is between your spinal cord and vertebrae  Steroids reduce inflammation and fluid buildup in your spine that may be causing pain  You may be given pain medicine along with the steroids  ACTIVITY  · Do not drive or operate machinery today  · No strenuous activity today - bending, lifting, etc   · You may resume normal activites starting tomorrow - start slowly and as tolerated  · You may shower today, but no tub baths or hot tubs  · You may have numbness for several hours from the local anesthetic  Please use caution and common sense, especially with weight-bearing activities  CARE OF THE INJECTION SITE  · If you have soreness or pain, apply ice to the area today (20 minutes on/20 minutes off)  · Starting tomorrow, you may use warm, moist heat or ice if needed  · You may have an increase or change in your discomfort for 36-48 hours after your treatment  · Apply ice and continue with any pain medication you have been prescribed  · Notify the [facility] if you have any of the following: redness, drainage, swelling, headache, stiff neck or fever above 100°F     SPECIAL INSTRUCTIONS  · Our office will contact you in approximately 7 days for a progress report  MEDICATIONS  · Continue to take all routine medications  · Our office may have instructed you to hold some medications  If you have a problem specifically related to your procedure, please call our office at [phone]  Problems not related to your procedure should be directed to your primary care physician

## 2018-12-28 ENCOUNTER — TELEPHONE (OUTPATIENT)
Dept: PAIN MEDICINE | Facility: CLINIC | Age: 33
End: 2018-12-28

## 2019-06-13 DIAGNOSIS — M25.862 PATELLOFEMORAL DYSFUNCTION OF LEFT KNEE: Primary | ICD-10-CM

## 2019-06-24 ENCOUNTER — EVALUATION (OUTPATIENT)
Dept: PHYSICAL THERAPY | Facility: MEDICAL CENTER | Age: 34
End: 2019-06-24
Payer: COMMERCIAL

## 2019-06-24 DIAGNOSIS — M25.862 PATELLOFEMORAL DYSFUNCTION OF LEFT KNEE: Primary | ICD-10-CM

## 2019-06-24 PROCEDURE — 97112 NEUROMUSCULAR REEDUCATION: CPT | Performed by: PHYSICAL THERAPIST

## 2019-06-24 PROCEDURE — 97161 PT EVAL LOW COMPLEX 20 MIN: CPT | Performed by: PHYSICAL THERAPIST

## 2019-06-27 ENCOUNTER — APPOINTMENT (OUTPATIENT)
Dept: PHYSICAL THERAPY | Facility: MEDICAL CENTER | Age: 34
End: 2019-06-27
Payer: COMMERCIAL

## 2019-07-03 ENCOUNTER — OFFICE VISIT (OUTPATIENT)
Dept: PHYSICAL THERAPY | Facility: MEDICAL CENTER | Age: 34
End: 2019-07-03
Payer: COMMERCIAL

## 2019-07-03 DIAGNOSIS — M25.862 PATELLOFEMORAL DYSFUNCTION OF LEFT KNEE: Primary | ICD-10-CM

## 2019-07-03 PROCEDURE — 97112 NEUROMUSCULAR REEDUCATION: CPT | Performed by: PHYSICAL THERAPIST

## 2019-07-03 PROCEDURE — 97140 MANUAL THERAPY 1/> REGIONS: CPT | Performed by: PHYSICAL THERAPIST

## 2019-07-03 NOTE — PROGRESS NOTES
Daily Note     Today's date: 7/3/2019  Patient name: Nat Amezcua  : 1985  MRN: [id number]  Referring provider: Keli Krishnan MD  Dx:   Encounter Diagnosis     ICD-10-CM    1  Patellofemoral dysfunction of left knee M25 862                   Subjective: Patient reports that knee extensions are painful  Objective: See treatment diary below    Precautions: None      Manual  6/24 7/3           STM/MFR  AZ VMO/ ITB           Patellar mobs  AZ                                                      Exercise Diary              Quad sets 30x5s 30x5s           Gastroc str 3x30s 3x30s           Hamstring str 3x30s 3x30s           Quad str 3x30s 3x30s           Bridges  3x10 5s hold GR           Clams  30x GR           Hamstring curls  Prone 3x10 GR           TKE  Prone 30x 5s                                     Foam Roll  home                                                                                                                                                 Modalities              HPL             KT AZ                           Assessment:  Patient presents with soft tissue restrictions t/o VMO and ITB  Mild pain at medial plica and MPFL  Patient tolerated exercise progressions well without pain  Tolerated treatment well  Patient would benefit from continued PT    Plan: Continue per plan of care

## 2019-07-08 ENCOUNTER — APPOINTMENT (OUTPATIENT)
Dept: PHYSICAL THERAPY | Facility: MEDICAL CENTER | Age: 34
End: 2019-07-08
Payer: COMMERCIAL

## 2019-07-15 ENCOUNTER — OFFICE VISIT (OUTPATIENT)
Dept: PHYSICAL THERAPY | Facility: MEDICAL CENTER | Age: 34
End: 2019-07-15
Payer: COMMERCIAL

## 2019-07-15 DIAGNOSIS — M25.862 PATELLOFEMORAL DYSFUNCTION OF LEFT KNEE: Primary | ICD-10-CM

## 2019-07-15 PROCEDURE — 97110 THERAPEUTIC EXERCISES: CPT | Performed by: PHYSICAL THERAPIST

## 2019-07-15 PROCEDURE — 97112 NEUROMUSCULAR REEDUCATION: CPT | Performed by: PHYSICAL THERAPIST

## 2019-07-15 PROCEDURE — 97140 MANUAL THERAPY 1/> REGIONS: CPT | Performed by: PHYSICAL THERAPIST

## 2019-07-15 NOTE — PROGRESS NOTES
Daily Note     Today's date: 7/15/2019  Patient name: Bhumi Patel  : 1985  MRN: [id number]  Referring provider: Kate Irene MD  Dx:   Encounter Diagnosis     ICD-10-CM    1  Patellofemoral dysfunction of left knee M25 862                   Subjective: Patient reports that squatting is painful  Objective: See treatment diary below    Precautions: None      Manual  6/24 7/3 7/15          STM/IASTM  AZ VMO/ ITB AZ          Patellar mobs  AZ                                                      Exercise Diary              Quad sets 30x5s 30x5s 30x5s          Gastroc str 3x30s 3x30s 3x30s          Hamstring str 3x30s 3x30s 3x30s          Quad str 3x30s 3x30s 3x30s          Bridges  3x10 5s hold GR 3x10 5s hold blue          Clams  30x GR 30x blue          Hamstring curls  Prone 3x10 GR Prone 3x10 GR          TKE  Prone 30x 5s Prone 30x 5s          PB ham curls   10x b/l                       Foam Roll  home home                                                                                                                                                Modalities              HPL             KT AZ                           Assessment:  Patient presents with moderate soft tissue restrictions t/o VMO and ITB  Mild pain at medial plica and MPFL  He was unable to tolerate squatting to box d/t "burning" t/o patellar ligament  Patient tolerated exercise progressions well without pain  Tolerated treatment well  Patient would benefit from continued PT    Plan: Continue per plan of care

## 2019-07-19 ENCOUNTER — OFFICE VISIT (OUTPATIENT)
Dept: PHYSICAL THERAPY | Facility: MEDICAL CENTER | Age: 34
End: 2019-07-19
Payer: COMMERCIAL

## 2019-07-19 DIAGNOSIS — M25.862 PATELLOFEMORAL DYSFUNCTION OF LEFT KNEE: Primary | ICD-10-CM

## 2019-07-19 PROCEDURE — 97112 NEUROMUSCULAR REEDUCATION: CPT | Performed by: PHYSICAL THERAPIST

## 2019-07-19 PROCEDURE — 97140 MANUAL THERAPY 1/> REGIONS: CPT | Performed by: PHYSICAL THERAPIST

## 2019-07-19 PROCEDURE — 97110 THERAPEUTIC EXERCISES: CPT | Performed by: PHYSICAL THERAPIST

## 2019-07-19 NOTE — PROGRESS NOTES
Daily Note     Today's date: 2019  Patient name: Yamileth Vasquez  : 1985  MRN: [id number]  Referring provider: Shelley Horowitz MD  Dx:   Encounter Diagnosis     ICD-10-CM    1  Patellofemoral dysfunction of left knee M25 862                   Subjective: Patient reports that he is feeling stiff because he just woke up  He explained that whether he is sitting, standing, lying down, etc  there is pain when staying in one position and that moving around feels better  Objective: See treatment diary below    Precautions: None      Manual  6/24 7/3 7/15 7/19         STM/IASTM  AZ VMO/ ITB AZ          Patellar mobs  AZ                                                      Exercise Diary              Quad sets 30x5s 30x5s 30x5s 30x5s         Gastroc str 3x30s 3x30s 3x30s 3x30s         Hamstring str 3x30s 3x30s 3x30s 3x30s         Quad str 3x30s 3x30s 3x30s 3x30s         Bridges  3x10 5s hold GR 3x10 5s hold blue 3x10 5s hold blue         Clams  30x GR 30x blue 30x blue         Hamstring curls  Prone 3x10 GR Prone 3x10 GR Prone 3x10 GR         TKE  Prone 30x 5s Prone 30x 5s Prone 30x 5s         PB ham curls   10x b/l 5x b/l 10x single leg roll out                      Foam Roll  home home home         Lateral Step Down    2x10 4"                                                                                                                                  Modalities              HPL             KT AZ                           Assessment:  Patient still presents with moderate soft tissue restrictions t/o VMO and ITB  Patient continues to do well with exercise progression with no pain  Patient presents with quadriceps hamstring imbalance that is evident during plyoball hamstring exercise  Patient was able to perform lateral step down without pain, but noted unsteadiness  Tolerated treatment well  Patient would benefit from continued PT for optimal return to function  Plan: Continue per plan of care

## 2019-07-23 DIAGNOSIS — L65.9 ALOPECIA: ICD-10-CM

## 2019-07-23 RX ORDER — FINASTERIDE 1 MG/1
TABLET, FILM COATED ORAL
Qty: 90 TABLET | Refills: 0 | Status: SHIPPED | OUTPATIENT
Start: 2019-07-23 | End: 2019-11-11 | Stop reason: SDUPTHER

## 2019-08-12 ENCOUNTER — OFFICE VISIT (OUTPATIENT)
Dept: PHYSICAL THERAPY | Facility: MEDICAL CENTER | Age: 34
End: 2019-08-12
Payer: COMMERCIAL

## 2019-08-12 DIAGNOSIS — M25.862 PATELLOFEMORAL DYSFUNCTION OF LEFT KNEE: Primary | ICD-10-CM

## 2019-08-12 PROCEDURE — 97140 MANUAL THERAPY 1/> REGIONS: CPT | Performed by: PHYSICAL THERAPIST

## 2019-08-12 PROCEDURE — 97110 THERAPEUTIC EXERCISES: CPT | Performed by: PHYSICAL THERAPIST

## 2019-08-12 NOTE — PROGRESS NOTES
Daily Note     Today's date: 2019  Patient name: Jamison Pizano  : 1985  MRN: [id number]  Referring provider: Victorino Greene MD  Dx:   Encounter Diagnosis     ICD-10-CM    1  Patellofemoral dysfunction of left knee M25 862                   Subjective: Patient reports that he still has inferior patellar pain with squatting and when moving from sitting to standing  Objective: See treatment diary below    Precautions: None    Manual  6/24 7/3 7/15 7/19 8/12        STM/IASTM  AZ VMO/ ITB AZ          Patellar mobs  AZ   AZ                                                   Exercise Diary              Quad sets 30x5s 30x5s 30x5s 30x5s 30x5s        Gastroc str 3x30s 3x30s 3x30s 3x30s 3x30s        Hamstring str 3x30s 3x30s 3x30s 3x30s 3x30s        Quad str 3x30s 3x30s 3x30s 3x30s 3x30s        Bridges  3x10 5s hold GR 3x10 5s hold blue 3x10 5s hold blue 3x10 5s hold blue        Clams  30x GR 30x blue 30x blue 30x blue        Hamstring curls  Prone 3x10 GR Prone 3x10 GR Prone 3x10 GR Prone 3x10 GR        TKE  Prone 30x 5s Prone 30x 5s Prone 30x 5s Prone 30x 5s        PB ham curls   10x b/l 5x b/l 10x single leg roll out home                     Foam Roll  home home home home        Lateral Step Down    2x10 4"                                                                                                                                  Modalities              HPL     AZ        KT AZ                           Assessment:  Patient presents with moderate fat pad inflammation and medial patellar tenderness  HPL to decrease pain inflammation  Tolerated treatment well  Patient would benefit from continued PT for optimal return to function  Plan: Continue per plan of care

## 2019-08-14 ENCOUNTER — APPOINTMENT (OUTPATIENT)
Dept: PHYSICAL THERAPY | Facility: MEDICAL CENTER | Age: 34
End: 2019-08-14
Payer: COMMERCIAL

## 2019-08-22 ENCOUNTER — APPOINTMENT (OUTPATIENT)
Dept: RADIOLOGY | Facility: OTHER | Age: 34
End: 2019-08-22
Payer: COMMERCIAL

## 2019-08-22 ENCOUNTER — OFFICE VISIT (OUTPATIENT)
Dept: OBGYN CLINIC | Facility: OTHER | Age: 34
End: 2019-08-22
Payer: COMMERCIAL

## 2019-08-22 VITALS
SYSTOLIC BLOOD PRESSURE: 118 MMHG | BODY MASS INDEX: 26.94 KG/M2 | WEIGHT: 188.2 LBS | HEART RATE: 68 BPM | DIASTOLIC BLOOD PRESSURE: 70 MMHG | HEIGHT: 70 IN

## 2019-08-22 DIAGNOSIS — M23.92 INTERNAL DERANGEMENT OF LEFT KNEE: Primary | ICD-10-CM

## 2019-08-22 DIAGNOSIS — M25.562 LEFT KNEE PAIN, UNSPECIFIED CHRONICITY: ICD-10-CM

## 2019-08-22 DIAGNOSIS — M25.862 PATELLOFEMORAL DYSFUNCTION OF LEFT KNEE: ICD-10-CM

## 2019-08-22 PROCEDURE — 73562 X-RAY EXAM OF KNEE 3: CPT

## 2019-08-22 PROCEDURE — 99204 OFFICE O/P NEW MOD 45 MIN: CPT | Performed by: ORTHOPAEDIC SURGERY

## 2019-08-22 NOTE — PROGRESS NOTES
Assessment  Diagnoses and all orders for this visit:    Internal derangement of left knee    Patellofemoral dysfunction left knee        Discussion and Plan:    · Patient has tried and failed formal physical therapy and home exercise program for the past 5 weeks for his left knee symptoms  On physical exam today there is a concern for possible meniscal pathology  · Next step in treatment process will be to order an MRI of his left knee to evaluate for internal derangement with focus on the meniscus  · May perform activities as tolerated  Avoid painful movements  · He will follow up after MRI for discussion result and treatment options based on these results  Subjective:   Patient ID: Yancy Niece is a 26 y o  male      The patient presents with a chief complaint of left knee pain  The pain began 3 month(s) ago and is not associated with an acute injury  Patient states that he began to feel the pain after his soccer season but had no acute injury  The patient describes the pain as aching and sharp and 4 out of 10 in intensity  It is intermittent, occurring with increasing frequency in timing, and localizes the pain to the anterior medial joint line, anterior lateral joint line, patellar tendon  The pain is worse with activities, running and squatting and relieved with rest, ice, avoiding painful activities  He reports mechanical symptoms such as locking and catching  He denies instability of the knee  Patient has had treatment in the form of formal physical therapy and with home exercise program for the past 5 weeks without benefit  The following portions of the patient's history were reviewed and updated as appropriate: allergies, current medications, past family history, past medical history, past social history, past surgical history and problem list     Review of Systems   Constitutional: Negative for chills, fatigue, fever and unexpected weight change     HENT: Negative for hearing loss, nosebleeds and sore throat  Eyes: Negative for pain, redness and visual disturbance  Respiratory: Negative for cough, shortness of breath and wheezing  Cardiovascular: Negative for chest pain, palpitations and leg swelling  Gastrointestinal: Negative for abdominal pain, nausea and vomiting  Endocrine: Negative for polydipsia and polyuria  Genitourinary: Negative for frequency and urgency  Skin: Negative for color change, rash and wound  Neurological: Negative for dizziness, weakness, numbness and headaches  Psychiatric/Behavioral: Negative for behavioral problems, self-injury and suicidal ideas  Objective:  Left Knee Exam     Tenderness   The patient is experiencing tenderness in the lateral joint line and medial joint line  Range of Motion   The patient has normal left knee ROM  Tests   Varus: negative Valgus: negative  Lachman:  Anterior - negative      Drawer:  Anterior - negative         Other   Erythema: absent  Sensation: normal  Pulse: present  Effusion: no effusion present    Comments:  Equivocal Izzy's test over the medial joint line            Physical Exam   Constitutional: He is oriented to person, place, and time  He appears well-developed and well-nourished  No distress  Eyes: Pupils are equal, round, and reactive to light  Conjunctivae are normal    Neck: Normal range of motion  Neck supple  Cardiovascular: Normal rate, regular rhythm and intact distal pulses  Pulmonary/Chest: Effort normal and breath sounds normal    Abdominal: Soft  Bowel sounds are normal    Musculoskeletal:        Left knee: He exhibits no effusion  Neurological: He is alert and oriented to person, place, and time  He has normal reflexes  Skin: Skin is warm and dry  No rash noted  No erythema  Psychiatric: He has a normal mood and affect  His behavior is normal          I have personally reviewed pertinent films in PACS and my interpretation is as follows      X-rays left knee performed on 08/22/2018:  Evidence of mild patella samuel with slightly shallow trochlear groove  No other acute osseous abnormality or degenerative changes      Scribe Attestation    I,:   Steve Weeks am acting as a scribe while in the presence of the attending physician :        I,:   Joss Serna MD personally performed the services described in this documentation    as scribed in my presence :

## 2019-08-28 ENCOUNTER — TELEPHONE (OUTPATIENT)
Dept: OBGYN CLINIC | Facility: HOSPITAL | Age: 34
End: 2019-08-28

## 2019-08-29 ENCOUNTER — HOSPITAL ENCOUNTER (OUTPATIENT)
Dept: MRI IMAGING | Facility: HOSPITAL | Age: 34
Discharge: HOME | End: 2019-08-29
Attending: ORTHOPAEDIC SURGERY
Payer: COMMERCIAL

## 2019-08-29 DIAGNOSIS — M25.862 PATELLOFEMORAL DYSFUNCTION OF LEFT KNEE: ICD-10-CM

## 2019-08-29 DIAGNOSIS — M23.92 INTERNAL DERANGEMENT OF LEFT KNEE: ICD-10-CM

## 2019-08-29 PROCEDURE — 73721 MRI JNT OF LWR EXTRE W/O DYE: CPT

## 2019-09-09 ENCOUNTER — OFFICE VISIT (OUTPATIENT)
Dept: PHYSICAL THERAPY | Facility: MEDICAL CENTER | Age: 34
End: 2019-09-09
Payer: COMMERCIAL

## 2019-09-09 DIAGNOSIS — M25.862 PATELLOFEMORAL DYSFUNCTION OF LEFT KNEE: Primary | ICD-10-CM

## 2019-09-09 PROCEDURE — 97112 NEUROMUSCULAR REEDUCATION: CPT | Performed by: PHYSICAL THERAPIST

## 2019-09-09 PROCEDURE — 97110 THERAPEUTIC EXERCISES: CPT | Performed by: PHYSICAL THERAPIST

## 2019-09-09 PROCEDURE — 97140 MANUAL THERAPY 1/> REGIONS: CPT | Performed by: PHYSICAL THERAPIST

## 2019-09-09 NOTE — PROGRESS NOTES
Re-Eval     Today's date: 2019  Patient name: Felicia Gil  : 1985  MRN: [id number]  Referring provider: Taylor Vila MD  Dx:   Encounter Diagnosis     ICD-10-CM    1  Patellofemoral dysfunction of left knee M25 862                   Subjective: Patient reports that he feels okay  Objective: See treatment diary below    Precautions: None    Manual  6/24 7/3 7/15 7/19 8/12 9/9       STM/IASTM  AZ VMO/ ITB AZ          Patellar mobs  AZ   AZ                                                   Exercise Diary              Quad sets 30x5s 30x5s 30x5s 30x5s 30x5s 30x5s       Gastroc str 3x30s 3x30s 3x30s 3x30s 3x30s 3x30s       Hamstring str 3x30s 3x30s 3x30s 3x30s 3x30s 3x30s       Quad str 3x30s 3x30s 3x30s 3x30s 3x30s 3x30s       Bridges  3x10 5s hold GR 3x10 5s hold blue 3x10 5s hold blue 3x10 5s hold blue 3x10 5s hold blue       Clams  30x GR 30x blue 30x blue 30x blue 30x blue       Hamstring curls  Prone 3x10 GR Prone 3x10 GR Prone 3x10 GR Prone 3x10 GR Prone 3x10 blue       TKE  Prone 30x 5s Prone 30x 5s Prone 30x 5s Prone 30x 5s Prone 3x15 5s       PB ham curls   10x b/l 5x b/l 10x single leg roll out home 10x SL                    Foam Roll  home home home home 2'       Lateral Step Down    2x10 4"  3x10 floor        alphabet                                                                                                                         Modalities              HPL     AZ AZ       KT AZ                           Assessment  Assessment details: Mak Guthrie is a 25 y/o male who presents with complaints of left knee pain  The patients greatest concerns are stiffness in the morning, or when sitting for extended periods of time  Patient would like to return to playing soccer and lifting weights  Patient presents with TTP to MPFL and inferior fat pad, as well as medial patellar hypomobility    Primary movement impairment diagnosis of left patellar instability resulting in pathoanatomical symptoms of patellofemoral dysfunction of left knee, which limits his ability to perform functional activities without pain  Pt  will benefit from skilled PT services that includes manual therapy techniques to enhance tissue extensibility, neuromuscular re-education to facilitate motor control, therapeutic exercise to increase functional mobility, and modalities prn to reduce pain and inflammation    Impairments: abnormal muscle firing, abnormal or restricted ROM, activity intolerance, impaired physical strength, lacks appropriate home exercise program and pain with function  Understanding of Dx/Px/POC: good   Prognosis: good    Goals  Impairment Goals  - Pt I with initial HEP in 1-2 visits- achieved  - Improve ROM equal to contralateral side in 4-6 weeks- achieved  - Increase strength to 5/5 in all affected areas in 4-6 weeks- in progress    Functional Goals  - Increase Functional Status Measure to: 70 in 6-8 weeks- in progress  - Patient will be independent with comprehensive HEP in 6-8 weeks- achieved  - Ambulation is improved to prior level of function in 6-8 weeks- achieved  - Stair climbing is improved to prior level of function in 6-8 weeks- achieved  - Squatting is improved to prior level of function in 6-8 weeks- in progress    Plan  Patient would benefit from: PT eval  Planned modality interventions: thermotherapy: hydrocollator packs, low level laser therapy and cryotherapy  Planned therapy interventions: joint mobilization, strengthening, stretching, therapeutic exercise, neuromuscular re-education, manual therapy, Santoyo taping, abdominal trunk stabilization, activity modification, body mechanics training, functional ROM exercises, flexibility, graded exercise, balance/weight bearing training and balance  Frequency: 1-2x week  Duration in weeks: 6  Treatment plan discussed with: patient      Subjective Evaluation    History of Present Illness  Mechanism of injury: Patient reports that 2 months ago he was playing soccer and left knee pain began after playing that night  Patient reports stiffness in the morning, or when sitting for extended periods of time  He can palpate medial patella pain that is specific in location  Patient would like to be able to play soccer, jog, and lift weights without pain  Occupation:  Nurse Anesthetist  Pain  Current pain ratin  At best pain ratin  At worst pain ratin  Pain location: Patellar tendon      MRI:  Tendinopathy with small interstitial tears of the proximal patellar tendon      Objective:    Strength/Myotome Testing     Left Hip   Planes of Motion   Flexion: 5  Extension: 5  Abduction: 4  Adduction: 5  External rotation: 5  Internal rotation: 5    Isolated Muscles   Iliopsoas: 5    Right Hip   Planes of Motion   Flexion: 5  Extension: 5  Abduction: 5  Adduction: 5  External rotation: 5  Internal rotation: 5    Isolated Muscles   Iliopsoas: 5    Left Knee   Flexion: 5  Extension: 5  Quadriceps contraction: good

## 2019-09-20 ENCOUNTER — OFFICE VISIT (OUTPATIENT)
Dept: PHYSICAL THERAPY | Facility: MEDICAL CENTER | Age: 34
End: 2019-09-20
Payer: COMMERCIAL

## 2019-09-20 DIAGNOSIS — M25.862 PATELLOFEMORAL DYSFUNCTION OF LEFT KNEE: Primary | ICD-10-CM

## 2019-09-20 PROCEDURE — 97112 NEUROMUSCULAR REEDUCATION: CPT | Performed by: PHYSICAL THERAPIST

## 2019-09-20 PROCEDURE — 97110 THERAPEUTIC EXERCISES: CPT | Performed by: PHYSICAL THERAPIST

## 2019-09-20 NOTE — PROGRESS NOTES
Daily Note     Today's date: 2019  Patient name: Helga Mathew  : 1985  MRN: [id number]  Referring provider: Sina Chong MD  Dx:   Encounter Diagnosis     ICD-10-CM    1  Patellofemoral dysfunction of left knee M25 862                   Subjective: Patient reports that his knee feels good, but it does feel weak  Objective: See treatment diary below    Precautions: None    Manual  6/24 7/3 7/15 7/19 8/12 9/9 9/20      STM/IASTM  AZ VMO/ ITB AZ          Patellar mobs  AZ   AZ  AZ                                                 Exercise Diary              Quad sets 30x5s 30x5s 30x5s 30x5s 30x5s 30x5s 30x5s      Gastroc str 3x30s 3x30s 3x30s 3x30s 3x30s 3x30s 3x30s      Hamstring str 3x30s 3x30s 3x30s 3x30s 3x30s 3x30s 10x b/l mob      Quad str 3x30s 3x30s 3x30s 3x30s 3x30s 3x30s 3x30s      Bridges  3x10 5s hold GR 3x10 5s hold blue 3x10 5s hold blue 3x10 5s hold blue 3x10 5s hold blue 3x10 5s hold blue      Clams  30x GR 30x blue 30x blue 30x blue 30x blue 30x blue      Hamstring curls  Prone 3x10 GR Prone 3x10 GR Prone 3x10 GR Prone 3x10 GR Prone 3x10 blue Prone 3x10 blue      TKE  Prone 30x 5s Prone 30x 5s Prone 30x 5s Prone 30x 5s Prone 3x15 5s Prone 3x15 5s 5#      PB ham curls   10x b/l 5x b/l 10x single leg roll out home 10x SL 10x SL                   Foam Roll  home home home home 2' home      Lateral Step Down    2x10 4"  3x10 floor  3x10 4"      alphabet             TKE lexus       30x 10#                                                                                                     Modalities              HPL     AZ AZ       KT AZ                           Assessment:  Patient presents without tenderness  Fair eccentric quad control with step downs  No pain while performing these  Tolerated treatment well  Patient would benefit from continued PT for optimal return to function  Plan: Continue per plan of care

## 2019-09-23 ENCOUNTER — OFFICE VISIT (OUTPATIENT)
Dept: PHYSICAL THERAPY | Facility: MEDICAL CENTER | Age: 34
End: 2019-09-23
Payer: COMMERCIAL

## 2019-09-23 DIAGNOSIS — M25.862 PATELLOFEMORAL DYSFUNCTION OF LEFT KNEE: Primary | ICD-10-CM

## 2019-09-23 PROCEDURE — 97112 NEUROMUSCULAR REEDUCATION: CPT | Performed by: PHYSICAL THERAPIST

## 2019-09-23 PROCEDURE — 97110 THERAPEUTIC EXERCISES: CPT | Performed by: PHYSICAL THERAPIST

## 2019-09-23 NOTE — PROGRESS NOTES
Daily Note     Today's date: 2019  Patient name: Minnie Ahumada  : 1985  MRN: [id number]  Referring provider: Jayshree Reinoso MD  Dx:   Encounter Diagnosis     ICD-10-CM    1  Patellofemoral dysfunction of left knee M25 [id number]                   Subjective: Patient reports that his knee feels okay, but is weak  Objective: See treatment diary below    Precautions: None    Manual  6/24 7/3 7/15 7/19 8/12 9/9 9/20 9/23     STM/IASTM  AZ VMO/ ITB AZ          Patellar mobs  AZ   AZ  AZ                                                 Exercise Diary              Quad sets 30x5s 30x5s 30x5s 30x5s 30x5s 30x5s 30x5s 30x5s     Gastroc str 3x30s 3x30s 3x30s 3x30s 3x30s 3x30s 3x30s 3x30s     Hamstring str 3x30s 3x30s 3x30s 3x30s 3x30s 3x30s 10x b/l mob 10x b/l mob     Quad str 3x30s 3x30s 3x30s 3x30s 3x30s 3x30s 3x30s 3x30s     Bridges  3x10 5s hold GR 3x10 5s hold blue 3x10 5s hold blue 3x10 5s hold blue 3x10 5s hold blue 3x10 5s hold blue 3x10 5s hold blue     Clams  30x GR 30x blue 30x blue 30x blue 30x blue 30x blue 30x blue     Hamstring curls  Prone 3x10 GR Prone 3x10 GR Prone 3x10 GR Prone 3x10 GR Prone 3x10 blue Prone 3x10 blue Prone 3x10 blue     TKE  Prone 30x 5s Prone 30x 5s Prone 30x 5s Prone 30x 5s Prone 3x15 5s Prone 3x15 5s 5# Prone 3x15 5s 5#     PB ham curls   10x b/l 5x b/l 10x single leg roll out home 10x SL 10x SL 10x SL                  Foam Roll  home home home home 2' home home     Lateral Step Down    2x10 4"  3x10 floor  3x10 4" 3x10 5"     alphabet             TKE lexus       30x 10# 30x 15#     SL squats        3x10 to box c/ airex                                                                                       Modalities              HPL     AZ AZ       KT AZ                           Assessment:  Patient presents with fair eccentric quad control and quad dominance throughout    He demonstrates difficulty activating posterior chain musculature with decreased hip hinging ability  Verbal cues to enhance form throughout  Tolerated treatment well  Patient would benefit from continued PT for optimal return to function  Plan: Continue per plan of care

## 2019-09-26 ENCOUNTER — TELEPHONE (OUTPATIENT)
Dept: PAIN MEDICINE | Facility: CLINIC | Age: 34
End: 2019-09-26

## 2019-09-26 DIAGNOSIS — M51.16 INTERVERTEBRAL DISC DISORDER WITH RADICULOPATHY OF LUMBAR REGION: Primary | ICD-10-CM

## 2019-09-26 NOTE — TELEPHONE ENCOUNTER
S/w pt, he said the pain in his R lower back and R leg has returned and he would like to get a repeat R L4-5 TFESI, last one December 2018  Pt said his symptoms have not changed and denies blood thinners  Told pt that Dr Brian Boo will place the order and then our  will contact him

## 2019-10-27 DIAGNOSIS — L65.9 ALOPECIA: ICD-10-CM

## 2019-10-28 RX ORDER — FINASTERIDE 1 MG/1
TABLET, FILM COATED ORAL
Qty: 90 TABLET | Refills: 0 | OUTPATIENT
Start: 2019-10-28

## 2019-11-07 ENCOUNTER — HOSPITAL ENCOUNTER (OUTPATIENT)
Dept: RADIOLOGY | Facility: CLINIC | Age: 34
Discharge: HOME | End: 2019-11-07
Attending: ANESTHESIOLOGY | Admitting: ANESTHESIOLOGY
Payer: COMMERCIAL

## 2019-11-07 VITALS
DIASTOLIC BLOOD PRESSURE: 70 MMHG | HEART RATE: 73 BPM | TEMPERATURE: 98.2 F | OXYGEN SATURATION: 98 % | SYSTOLIC BLOOD PRESSURE: 105 MMHG | RESPIRATION RATE: 20 BRPM

## 2019-11-07 DIAGNOSIS — M51.16 INTERVERTEBRAL DISC DISORDER WITH RADICULOPATHY OF LUMBAR REGION: ICD-10-CM

## 2019-11-07 PROCEDURE — 64483 NJX AA&/STRD TFRM EPI L/S 1: CPT | Performed by: ANESTHESIOLOGY

## 2019-11-07 RX ORDER — 0.9 % SODIUM CHLORIDE 0.9 %
10 VIAL (ML) INJECTION ONCE
Status: COMPLETED | OUTPATIENT
Start: 2019-11-07 | End: 2019-11-07

## 2019-11-07 RX ORDER — BUPIVACAINE HCL/PF 2.5 MG/ML
10 VIAL (ML) INJECTION ONCE
Status: COMPLETED | OUTPATIENT
Start: 2019-11-07 | End: 2019-11-07

## 2019-11-07 RX ORDER — METHYLPREDNISOLONE ACETATE 80 MG/ML
80 INJECTION, SUSPENSION INTRA-ARTICULAR; INTRALESIONAL; INTRAMUSCULAR; PARENTERAL; SOFT TISSUE ONCE
Status: COMPLETED | OUTPATIENT
Start: 2019-11-07 | End: 2019-11-07

## 2019-11-07 RX ADMIN — IOHEXOL 1 ML: 300 INJECTION, SOLUTION INTRAVENOUS at 15:35

## 2019-11-07 RX ADMIN — SODIUM CHLORIDE 5 ML: 9 INJECTION, SOLUTION INTRAMUSCULAR; INTRAVENOUS; SUBCUTANEOUS at 15:34

## 2019-11-07 RX ADMIN — BUPIVACAINE HYDROCHLORIDE 2 ML: 2.5 INJECTION, SOLUTION EPIDURAL; INFILTRATION; INTRACAUDAL at 15:35

## 2019-11-07 RX ADMIN — Medication 5 ML: at 15:34

## 2019-11-07 RX ADMIN — METHYLPREDNISOLONE ACETATE 80 MG: 80 INJECTION, SUSPENSION INTRA-ARTICULAR; INTRALESIONAL; INTRAMUSCULAR; PARENTERAL; SOFT TISSUE at 15:35

## 2019-11-07 NOTE — H&P
History of Present Illness: The patient is a 37 y o  male who presents with complaints of lower back pain secondary to lumbar disc bulging and is here today for bilateral L4 transforaminal epidural steroid injection      Patient Active Problem List   Diagnosis    Chronic low back pain    Disc degeneration, lumbar    Lumbar disc herniation    Physical exam    Alopecia    Intervertebral disc disorder with radiculopathy of lumbar region    Patellofemoral dysfunction of left knee    Internal derangement of left knee       Past Medical History:   Diagnosis Date    Asthma     Esophageal reflux        Past Surgical History:   Procedure Laterality Date    TOOTH EXTRACTION           Current Outpatient Medications:     albuterol (PROVENTIL HFA,VENTOLIN HFA) 90 mcg/act inhaler, Inhale as needed, Disp: , Rfl:     cetirizine (ZYRTEC ALLERGY) 10 mg tablet, Take 10 mg by mouth daily, Disp: , Rfl:     finasteride (PROPECIA) 1 MG tablet, TAKE 1 TABLET BY MOUTH EVERY DAY, Disp: 90 tablet, Rfl: 0    multivitamin (THERAGRAN) TABS, Take 1 tablet by mouth daily, Disp: , Rfl:     naproxen (NAPROSYN) 500 mg tablet, Take 1 tablet by mouth as needed, Disp: , Rfl:     ranitidine (ZANTAC) 150 mg tablet, Take 150 mg by mouth 2 (two) times a day, Disp: , Rfl:     Current Facility-Administered Medications:     bupivacaine (PF) (MARCAINE) 0 25 % injection 10 mL, 10 mL, Epidural, Once, Justus Peralta MD    iohexol (OMNIPAQUE) 300 mg/mL injection 50 mL, 50 mL, Epidural, Once, Justus Peralta MD    lidocaine (PF) (XYLOCAINE-MPF) 2 % injection 5 mL, 5 mL, Infiltration, Once, Justus Peralta MD    methylPREDNISolone acetate (DEPO-MEDROL) injection 80 mg, 80 mg, Epidural, Once, Justus Peralta MD    sodium chloride (PF) 0 9 % injection 10 mL, 10 mL, Infiltration, Once, Justus Peralta MD    No Known Allergies    Physical Exam:   Vitals:    11/07/19 1513   BP: 110/73   Pulse: 73   Resp: 18   Temp: 98 2 °F (36 8 °C)   SpO2: 98% General: Awake, Alert, Oriented x 3, Mood and affect appropriate  Respiratory: Respirations even and unlabored  Cardiovascular: Peripheral pulses intact; no edema  Musculoskeletal Exam:   Lower back tenderness    ASA Score: 1    Patient/Chart Verification  Patient ID Verified: Verbal  ID Band Applied: No  Consents Confirmed: Procedural, To be obtained in the Pre-Procedure area  H&P( within 30 days) Verified: To be obtained in the Pre-Procedure area  Interval H&P(within 24 hr) Complete (required for Outpatients and Surgery Admit only): To be obtained in the Pre-Procedure area  Allergies Reviewed: Yes  Anticoag/NSAID held?: NA  Currently on antibiotics?: No    Assessment:   1   Intervertebral disc disorder with radiculopathy of lumbar region        Plan: Right L4-5 TF DYANA

## 2019-11-07 NOTE — DISCHARGE INSTR - LAB
Epidural Steroid Injection   WHAT YOU NEED TO KNOW:   An epidural steroid injection (DYANA) is a procedure to inject steroid medicine into the epidural space  The epidural space is between your spinal cord and vertebrae  Steroids reduce inflammation and fluid buildup in your spine that may be causing pain  You may be given pain medicine along with the steroids  ACTIVITY  · Do not drive or operate machinery today  · No strenuous activity today - bending, lifting, etc   · You may resume normal activites starting tomorrow - start slowly and as tolerated  · You may shower today, but no tub baths or hot tubs  · You may have numbness for several hours from the local anesthetic  Please use caution and common sense, especially with weight-bearing activities  CARE OF THE INJECTION SITE  · If you have soreness or pain, apply ice to the area today (20 minutes on/20 minutes off)  · Starting tomorrow, you may use warm, moist heat or ice if needed  · You may have an increase or change in your discomfort for 36-48 hours after your treatment  · Apply ice and continue with any pain medication you have been prescribed  · Notify [facility] if you have any of the following: redness, drainage, swelling, headache, stiff neck or fever above 100°F     SPECIAL INSTRUCTIONS  · Our office will contact you in approximately 7 days for a progress report  MEDICATIONS  · Continue to take all routine medications  · Our office may have instructed you to hold some medications  If you have a problem specifically related to your procedure, please call our office at [phone]  Problems not related to your procedure should be directed to your primary care physician

## 2019-11-09 DIAGNOSIS — L65.9 ALOPECIA: ICD-10-CM

## 2019-11-11 DIAGNOSIS — L65.9 ALOPECIA: ICD-10-CM

## 2019-11-11 RX ORDER — FINASTERIDE 1 MG/1
1 TABLET, FILM COATED ORAL DAILY
Qty: 90 TABLET | Refills: 0 | Status: SHIPPED | OUTPATIENT
Start: 2019-11-11 | End: 2020-02-10

## 2019-11-11 RX ORDER — FINASTERIDE 1 MG/1
TABLET, FILM COATED ORAL
Qty: 90 TABLET | Refills: 0 | OUTPATIENT
Start: 2019-11-11

## 2019-11-14 ENCOUNTER — TELEPHONE (OUTPATIENT)
Dept: PAIN MEDICINE | Facility: CLINIC | Age: 34
End: 2019-11-14

## 2019-11-14 DIAGNOSIS — L65.9 ALOPECIA: Primary | ICD-10-CM

## 2019-11-15 ENCOUNTER — OFFICE VISIT (OUTPATIENT)
Dept: FAMILY MEDICINE CLINIC | Facility: CLINIC | Age: 34
End: 2019-11-15
Payer: COMMERCIAL

## 2019-11-15 ENCOUNTER — APPOINTMENT (OUTPATIENT)
Dept: LAB | Facility: CLINIC | Age: 34
End: 2019-11-15
Payer: COMMERCIAL

## 2019-11-15 ENCOUNTER — TELEPHONE (OUTPATIENT)
Dept: FAMILY MEDICINE CLINIC | Facility: CLINIC | Age: 34
End: 2019-11-15

## 2019-11-15 VITALS
BODY MASS INDEX: 26.43 KG/M2 | TEMPERATURE: 97 F | WEIGHT: 184.2 LBS | SYSTOLIC BLOOD PRESSURE: 120 MMHG | DIASTOLIC BLOOD PRESSURE: 70 MMHG

## 2019-11-15 DIAGNOSIS — F41.9 ANXIETY: ICD-10-CM

## 2019-11-15 DIAGNOSIS — L65.9 ALOPECIA: ICD-10-CM

## 2019-11-15 DIAGNOSIS — Z00.00 PERIODIC HEALTH ASSESSMENT, GENERAL SCREENING, ADULT: Primary | ICD-10-CM

## 2019-11-15 DIAGNOSIS — Z00.00 PERIODIC HEALTH ASSESSMENT, GENERAL SCREENING, ADULT: ICD-10-CM

## 2019-11-15 LAB
ALBUMIN SERPL BCP-MCNC: 4.8 G/DL (ref 3.5–5)
ALP SERPL-CCNC: 83 U/L (ref 46–116)
ALT SERPL W P-5'-P-CCNC: 33 U/L (ref 12–78)
ANION GAP SERPL CALCULATED.3IONS-SCNC: 3 MMOL/L (ref 4–13)
AST SERPL W P-5'-P-CCNC: 15 U/L (ref 5–45)
BILIRUB SERPL-MCNC: 0.84 MG/DL (ref 0.2–1)
BUN SERPL-MCNC: 22 MG/DL (ref 5–25)
CALCIUM SERPL-MCNC: 9.3 MG/DL (ref 8.3–10.1)
CHLORIDE SERPL-SCNC: 106 MMOL/L (ref 100–108)
CHOLEST SERPL-MCNC: 142 MG/DL (ref 50–200)
CO2 SERPL-SCNC: 33 MMOL/L (ref 21–32)
CREAT SERPL-MCNC: 1.15 MG/DL (ref 0.6–1.3)
GFR SERPL CREATININE-BSD FRML MDRD: 81 ML/MIN/1.73SQ M
GLUCOSE P FAST SERPL-MCNC: 92 MG/DL (ref 65–99)
HDLC SERPL-MCNC: 47 MG/DL
LDLC SERPL CALC-MCNC: 80 MG/DL (ref 0–100)
NONHDLC SERPL-MCNC: 95 MG/DL
POTASSIUM SERPL-SCNC: 4.3 MMOL/L (ref 3.5–5.3)
PROT SERPL-MCNC: 7.6 G/DL (ref 6.4–8.2)
PSA SERPL-MCNC: 0.4 NG/ML (ref 0–4)
SODIUM SERPL-SCNC: 142 MMOL/L (ref 136–145)
TRIGL SERPL-MCNC: 77 MG/DL

## 2019-11-15 PROCEDURE — 36415 COLL VENOUS BLD VENIPUNCTURE: CPT

## 2019-11-15 PROCEDURE — 80061 LIPID PANEL: CPT

## 2019-11-15 PROCEDURE — G0103 PSA SCREENING: HCPCS

## 2019-11-15 PROCEDURE — 99395 PREV VISIT EST AGE 18-39: CPT | Performed by: FAMILY MEDICINE

## 2019-11-15 PROCEDURE — 80053 COMPREHEN METABOLIC PANEL: CPT

## 2019-11-15 RX ORDER — LORAZEPAM 0.5 MG/1
0.5 TABLET ORAL EVERY 8 HOURS PRN
Qty: 10 TABLET | Refills: 0 | Status: SHIPPED | OUTPATIENT
Start: 2019-11-15 | End: 2019-11-15 | Stop reason: SDUPTHER

## 2019-11-15 RX ORDER — LORAZEPAM 0.5 MG/1
0.5 TABLET ORAL EVERY 8 HOURS PRN
Qty: 10 TABLET | Refills: 0 | Status: SHIPPED | OUTPATIENT
Start: 2019-11-15 | End: 2020-08-25 | Stop reason: ALTCHOICE

## 2019-11-15 NOTE — PROGRESS NOTES
FAMILY PRACTICE OFFICE VISIT       NAME: Carmen Shafer  AGE: 37 y o  SEX: male       : 1985        MRN: [id number]    DATE: 11/15/2019  TIME: 10:32 AM    Assessment and Plan     Problem List Items Addressed This Visit        Other    Periodic health assessment, general screening, adult - Primary     Well adult  Patient appears to be in stable health  He will obtain blood work as ordered  His finasteride medication was renewed         Relevant Orders    Lipid panel    Comprehensive metabolic panel    Anxiety     Anxiety  Patient given prescription for lorazepam 0 5 mg to use every 8 hours as needed for his upcoming airplane flight         Relevant Medications    LORazepam (ATIVAN) 0 5 mg tablet              Chief Complaint     Chief Complaint   Patient presents with    Follow-up     anciety medication for plane ride        History of Present Illness     Patient denies any recent illness  He did have an epidural injection to his spine by [id number] [facility]'s pain management for chronic intermittent lumbar back pain  Patient does exercise on a regular bases at the gym  He denies any weight changes  Patient also related that he will be leaving on his honeymoon to [facility] in the next several weeks  He requested medication for anxiety related to flying on an airplane  Review of Systems   Review of Systems   Constitutional: Negative  HENT: Negative  Respiratory: Negative  Cardiovascular: Negative  Gastrointestinal: Negative  Musculoskeletal:        As per HPI   Skin:        Patient does take his finasteride on a regular basis for prevention of hair loss   Neurological: Negative      Psychiatric/Behavioral:        As per HPI       Active Problem List     Patient Active Problem List   Diagnosis    Chronic low back pain    Disc degeneration, lumbar    Lumbar disc herniation    Periodic health assessment, general screening, adult    Alopecia    Intervertebral disc disorder with radiculopathy of lumbar region    Patellofemoral dysfunction of left knee    Internal derangement of left knee    Anxiety       Past Medical History:  Past Medical History:   Diagnosis Date    Asthma     Esophageal reflux        Past Surgical History:  Past Surgical History:   Procedure Laterality Date    TOOTH EXTRACTION         Family History:  Family History   Problem Relation Age of Onset    No Known Problems Mother     Hyperlipidemia Father     Hypertension Father     Cancer Maternal Grandfather     Hypertension Paternal Grandmother     Hypertension Paternal Grandfather        Social History:  Social History     Socioeconomic History    Marital status: Single     Spouse name: Not on file    Number of children: Not on file    Years of education: Not on file    Highest education level: Not on file   Occupational History    Not on file   Social Needs    Financial resource strain: Not on file    Food insecurity:     Worry: Not on file     Inability: Not on file    Transportation needs:     Medical: Not on file     Non-medical: Not on file   Tobacco Use    Smoking status: Never Smoker    Smokeless tobacco: Never Used   Substance and Sexual Activity    Alcohol use: Yes     Comment: social    Drug use: No    Sexual activity: Yes   Lifestyle    Physical activity:     Days per week: Not on file     Minutes per session: Not on file    Stress: Not on file   Relationships    Social connections:     Talks on phone: Not on file     Gets together: Not on file     Attends [employer] service: Not on file     Active member of club or organization: Not on file     Attends meetings of clubs or organizations: Not on file     Relationship status: Not on file    Intimate partner violence:     Fear of current or ex partner: Not on file     Emotionally abused: Not on file     Physically abused: Not on file     Forced sexual activity: Not on file   Other Topics Concern    Not on file   Social History Narrative    Not on file       Objective     Vitals:    11/15/19 0938   BP: 120/70   Temp: (!) 97 °F (36 1 °C)     Wt Readings from Last 3 Encounters:   11/15/19 83 6 kg (184 lb 3 2 oz)   08/22/19 85 4 kg (188 lb 3 2 oz)   12/19/18 85 7 kg (189 lb)       Physical Exam   Constitutional: He is oriented to person, place, and time  He appears well-developed and well-nourished  HENT:   Right Ear: External ear normal    Left Ear: External ear normal    Nose: Nose normal    Mouth/Throat: Oropharynx is clear and moist    Tympanic membranes normal  Pharynx clear   Eyes: Pupils are equal, round, and reactive to light  Conjunctivae and EOM are normal    Neck: Normal range of motion  Neck supple  No thyromegaly present  Cardiovascular: Normal rate, regular rhythm and normal heart sounds  No murmur heard  Pulmonary/Chest: Effort normal and breath sounds normal    Abdominal: Soft  Bowel sounds are normal  There is no tenderness  NO hepatospenomegaly   Musculoskeletal: Normal range of motion  He exhibits no edema  Lymphadenopathy:     He has no cervical adenopathy  Neurological: He is alert and oriented to person, place, and time  Skin:   NO RASHES   Psychiatric: He has a normal mood and affect  Nursing note and vitals reviewed        Pertinent Laboratory/Diagnostic Studies:  Lab Results   Component Value Date    GLUCOSE 87 07/01/2015    BUN 20 07/01/2015    CREATININE 1 17 07/01/2015    CALCIUM 8 9 07/01/2015     07/01/2015    K 4 1 07/01/2015    CO2 31 07/01/2015     07/01/2015     Lab Results   Component Value Date    ALT 40 01/24/2017    AST 15 01/24/2017    ALKPHOS 84 01/24/2017    BILITOT 0 63 07/01/2015       Lab Results   Component Value Date    WBC 6 08 07/01/2015    HGB 14 6 07/01/2015    HCT 41 9 07/01/2015    MCV 85 07/01/2015     07/01/2015       No results found for: TSH    Lab Results   Component Value Date    CHOL 120 07/01/2015     Lab Results   Component Value Date    TRIG 71 07/01/2015     Lab Results   Component Value Date    HDL 42 07/01/2015     Lab Results   Component Value Date    LDLCALC 64 07/01/2015     No results found for: HGBA1C    Results for orders placed or performed in visit on 08/01/18   PSA, Total Screen   Result Value Ref Range    PSA 0 6 0 0 - 4 0 ng/mL       Orders Placed This Encounter   Procedures    Lipid panel    Comprehensive metabolic panel       ALLERGIES:  No Known Allergies    Current Medications     Current Outpatient Medications   Medication Sig Dispense Refill    albuterol (PROVENTIL HFA,VENTOLIN HFA) 90 mcg/act inhaler Inhale as needed      cetirizine (ZYRTEC ALLERGY) 10 mg tablet Take 10 mg by mouth daily      finasteride (PROPECIA) 1 MG tablet Take 1 tablet (1 mg total) by mouth daily 90 tablet 0    multivitamin (THERAGRAN) TABS Take 1 tablet by mouth daily      naproxen (NAPROSYN) 500 mg tablet Take 1 tablet by mouth as needed      ranitidine (ZANTAC) 150 mg tablet Take 150 mg by mouth 2 (two) times a day      LORazepam (ATIVAN) 0 5 mg tablet Take 1 tablet (0 5 mg total) by mouth every 8 (eight) hours as needed for anxiety 10 tablet 0     No current facility-administered medications for this visit            Health Maintenance     Health Maintenance   Topic Date Due    DTaP,Tdap,and Td Vaccines (2 - Tdap) 10/12/1994    BMI: Followup Plan  10/12/2001    Influenza Vaccine  07/01/2019    PT PLAN OF CARE  07/25/2019    Depression Screening PHQ  06/24/2020    BMI: Adult  08/22/2020    Pneumococcal Vaccine: 65+ Years (1 of 2 - PCV13) 10/12/2048    HIV Screening  Completed    Hepatitis C Screening  Completed    Pneumococcal Vaccine: Pediatrics (0 to 5 Years) and At-Risk Patients (6 to 59 Years)  Aged Out    HIB Vaccine  Aged Out    Hepatitis B Vaccine  Aged Out    IPV Vaccine  Aged Out    Hepatitis A Vaccine  Aged Out    HPV Vaccine  Aged [employer] History   Administered Date(s) Administered    Meningococcal Polysaccharide (MPSV4) 07/17/2002    Td (adult), adsorbed 40/44/8235       Raffaele Nielsen MD

## 2019-11-15 NOTE — ASSESSMENT & PLAN NOTE
Well adult  Patient appears to be in stable health  He will obtain blood work as ordered    His finasteride medication was renewed

## 2019-11-15 NOTE — TELEPHONE ENCOUNTER
----- Message from Sanya Ceja MD sent at 16/34/1836 12:25 PM EST -----  All recent blood work stable for patient

## 2019-11-15 NOTE — ASSESSMENT & PLAN NOTE
Anxiety    Patient given prescription for lorazepam 0 5 mg to use every 8 hours as needed for his upcoming airplane flight

## 2020-02-06 DIAGNOSIS — J45.909 REACTIVE AIRWAY DISEASE WITHOUT COMPLICATION, UNSPECIFIED ASTHMA SEVERITY, UNSPECIFIED WHETHER PERSISTENT: Primary | ICD-10-CM

## 2020-02-06 RX ORDER — ALBUTEROL SULFATE 90 UG/1
2 AEROSOL, METERED RESPIRATORY (INHALATION) EVERY 4 HOURS PRN
Qty: 1 INHALER | Refills: 3 | Status: SHIPPED | OUTPATIENT
Start: 2020-02-06

## 2020-02-07 DIAGNOSIS — L65.9 ALOPECIA: ICD-10-CM

## 2020-02-09 DIAGNOSIS — L65.9 ALOPECIA: ICD-10-CM

## 2020-02-10 RX ORDER — FINASTERIDE 1 MG/1
1 TABLET, FILM COATED ORAL DAILY
Qty: 90 TABLET | Refills: 1 | Status: SHIPPED | OUTPATIENT
Start: 2020-02-10 | End: 2021-03-03 | Stop reason: SDUPTHER

## 2020-02-10 RX ORDER — FINASTERIDE 1 MG/1
TABLET, FILM COATED ORAL
Qty: 90 TABLET | Refills: 3 | Status: SHIPPED | OUTPATIENT
Start: 2020-02-10 | End: 2020-08-25 | Stop reason: SDUPTHER

## 2020-03-03 DIAGNOSIS — M54.50 CHRONIC LOW BACK PAIN: Primary | ICD-10-CM

## 2020-03-03 DIAGNOSIS — G89.29 CHRONIC LOW BACK PAIN: Primary | ICD-10-CM

## 2020-03-03 RX ORDER — METHYLPREDNISOLONE 4 MG/1
TABLET ORAL
Qty: 21 TABLET | Refills: 0 | Status: SHIPPED | OUTPATIENT
Start: 2020-03-03 | End: 2020-08-25 | Stop reason: ALTCHOICE

## 2020-03-04 ENCOUNTER — TELEPHONE (OUTPATIENT)
Dept: PAIN MEDICINE | Facility: CLINIC | Age: 35
End: 2020-03-04

## 2020-03-04 NOTE — TELEPHONE ENCOUNTER
Patient   [phone]  Dr Sravanthi Phan    Patient is requesting a call back he would like to schedule an injection with Dr Sravanthi Phan  Please follow up

## 2020-03-05 DIAGNOSIS — M51.16 INTERVERTEBRAL DISC DISORDER WITH RADICULOPATHY OF LUMBAR REGION: Primary | ICD-10-CM

## 2020-03-05 NOTE — TELEPHONE ENCOUNTER
Please see below  Patient's last actual OV with you was Dec of 2018  He had an injection in 2019  He has [employer] and I cannot send notes from 2018 for authorization  Should he be scheduled for an OV? Please advise

## 2020-06-23 ENCOUNTER — TELEPHONE (OUTPATIENT)
Dept: PAIN MEDICINE | Facility: CLINIC | Age: 35
End: 2020-06-23

## 2020-06-23 NOTE — TELEPHONE ENCOUNTER
Pt left VM asking for a return phone call  He is having a life insurance evaluation and needs a list of all previous pain management appts and appt notes to submit for the evaluation      Pt call back # [phone]

## 2020-07-13 DIAGNOSIS — Z20.822 ENCOUNTER FOR LABORATORY TESTING FOR COVID-19 VIRUS: Primary | ICD-10-CM

## 2020-07-13 DIAGNOSIS — Z20.822 ENCOUNTER FOR LABORATORY TESTING FOR COVID-19 VIRUS: ICD-10-CM

## 2020-07-13 PROCEDURE — U0003 INFECTIOUS AGENT DETECTION BY NUCLEIC ACID (DNA OR RNA); SEVERE ACUTE RESPIRATORY SYNDROME CORONAVIRUS 2 (SARS-COV-2) (CORONAVIRUS DISEASE [COVID-19]), AMPLIFIED PROBE TECHNIQUE, MAKING USE OF HIGH THROUGHPUT TECHNOLOGIES AS DESCRIBED BY CMS-2020-01-R: HCPCS

## 2020-07-14 LAB
INPATIENT: NORMAL
SARS-COV-2 RNA SPEC QL NAA+PROBE: NOT DETECTED

## 2020-07-20 DIAGNOSIS — Z11.59 SPECIAL SCREENING EXAMINATION FOR UNSPECIFIED VIRAL DISEASE: Primary | ICD-10-CM

## 2020-07-20 PROCEDURE — U0003 INFECTIOUS AGENT DETECTION BY NUCLEIC ACID (DNA OR RNA); SEVERE ACUTE RESPIRATORY SYNDROME CORONAVIRUS 2 (SARS-COV-2) (CORONAVIRUS DISEASE [COVID-19]), AMPLIFIED PROBE TECHNIQUE, MAKING USE OF HIGH THROUGHPUT TECHNOLOGIES AS DESCRIBED BY CMS-2020-01-R: HCPCS | Performed by: OBSTETRICS & GYNECOLOGY

## 2020-07-22 LAB
INPATIENT: NORMAL
SARS-COV-2 RNA SPEC QL NAA+PROBE: NOT DETECTED

## 2020-08-07 ENCOUNTER — TELEPHONE (OUTPATIENT)
Dept: FAMILY MEDICINE CLINIC | Facility: CLINIC | Age: 35
End: 2020-08-07

## 2020-08-07 NOTE — TELEPHONE ENCOUNTER
Patient called and made appt for 8/25 and needs a script for labs to be done prior    Please call to advise when ready

## 2020-08-10 ENCOUNTER — TELEPHONE (OUTPATIENT)
Dept: FAMILY MEDICINE CLINIC | Facility: CLINIC | Age: 35
End: 2020-08-10

## 2020-08-10 NOTE — TELEPHONE ENCOUNTER
Please call patient  His last blood work was performed in November 2019  His insurance may not cover PSA testing blood once a year    If he would like he could postpone his office visit or he may have office visit and wait for blood test until November

## 2020-08-21 ENCOUNTER — TELEPHONE (OUTPATIENT)
Dept: FAMILY MEDICINE CLINIC | Facility: CLINIC | Age: 35
End: 2020-08-21

## 2020-08-21 ENCOUNTER — APPOINTMENT (OUTPATIENT)
Dept: LAB | Facility: AMBULARY SURGERY CENTER | Age: 35
End: 2020-08-21
Payer: COMMERCIAL

## 2020-08-21 DIAGNOSIS — Z00.00 PERIODIC HEALTH ASSESSMENT, GENERAL SCREENING, ADULT: ICD-10-CM

## 2020-08-21 DIAGNOSIS — L65.9 ALOPECIA: ICD-10-CM

## 2020-08-21 DIAGNOSIS — L65.9 ALOPECIA: Primary | ICD-10-CM

## 2020-08-21 LAB
ALBUMIN SERPL BCP-MCNC: 4.5 G/DL (ref 3.5–5)
ALP SERPL-CCNC: 73 U/L (ref 46–116)
ALT SERPL W P-5'-P-CCNC: 59 U/L (ref 12–78)
ANION GAP SERPL CALCULATED.3IONS-SCNC: 5 MMOL/L (ref 4–13)
AST SERPL W P-5'-P-CCNC: 36 U/L (ref 5–45)
BILIRUB SERPL-MCNC: 0.68 MG/DL (ref 0.2–1)
BUN SERPL-MCNC: 20 MG/DL (ref 5–25)
CALCIUM SERPL-MCNC: 9.1 MG/DL (ref 8.3–10.1)
CHLORIDE SERPL-SCNC: 105 MMOL/L (ref 100–108)
CHOLEST SERPL-MCNC: 173 MG/DL (ref 50–200)
CO2 SERPL-SCNC: 31 MMOL/L (ref 21–32)
CREAT SERPL-MCNC: 1.02 MG/DL (ref 0.6–1.3)
ERYTHROCYTE [DISTWIDTH] IN BLOOD BY AUTOMATED COUNT: 12.4 % (ref 11.6–15.1)
GFR SERPL CREATININE-BSD FRML MDRD: 94 ML/MIN/1.73SQ M
GLUCOSE P FAST SERPL-MCNC: 72 MG/DL (ref 65–99)
HCT VFR BLD AUTO: 45.4 % (ref 36.5–49.3)
HDLC SERPL-MCNC: 49 MG/DL
HGB BLD-MCNC: 16 G/DL (ref 12–17)
LDLC SERPL CALC-MCNC: 98 MG/DL (ref 0–100)
MCH RBC QN AUTO: 32.1 PG (ref 26.8–34.3)
MCHC RBC AUTO-ENTMCNC: 35.2 G/DL (ref 31.4–37.4)
MCV RBC AUTO: 91 FL (ref 82–98)
NONHDLC SERPL-MCNC: 124 MG/DL
PLATELET # BLD AUTO: 194 THOUSANDS/UL (ref 149–390)
PMV BLD AUTO: 10 FL (ref 8.9–12.7)
POTASSIUM SERPL-SCNC: 4 MMOL/L (ref 3.5–5.3)
PROT SERPL-MCNC: 7.5 G/DL (ref 6.4–8.2)
RBC # BLD AUTO: 4.98 MILLION/UL (ref 3.88–5.62)
SODIUM SERPL-SCNC: 141 MMOL/L (ref 136–145)
TRIGL SERPL-MCNC: 131 MG/DL
TSH SERPL DL<=0.05 MIU/L-ACNC: 2.28 UIU/ML (ref 0.36–3.74)
WBC # BLD AUTO: 7.43 THOUSAND/UL (ref 4.31–10.16)

## 2020-08-21 PROCEDURE — 80053 COMPREHEN METABOLIC PANEL: CPT

## 2020-08-21 PROCEDURE — 85027 COMPLETE CBC AUTOMATED: CPT

## 2020-08-21 PROCEDURE — 36415 COLL VENOUS BLD VENIPUNCTURE: CPT

## 2020-08-21 PROCEDURE — 84443 ASSAY THYROID STIM HORMONE: CPT

## 2020-08-21 PROCEDURE — 80061 LIPID PANEL: CPT

## 2020-08-21 NOTE — TELEPHONE ENCOUNTER
Lab called stating patient was in for blood work this morning but there are no orders, they did pull his labs anyway, can orders be placed for routine blood work, please and thank you

## 2020-08-25 ENCOUNTER — OFFICE VISIT (OUTPATIENT)
Dept: FAMILY MEDICINE CLINIC | Facility: CLINIC | Age: 35
End: 2020-08-25
Payer: COMMERCIAL

## 2020-08-25 VITALS
BODY MASS INDEX: 26.87 KG/M2 | HEIGHT: 69 IN | WEIGHT: 181.4 LBS | TEMPERATURE: 97.6 F | OXYGEN SATURATION: 97 % | SYSTOLIC BLOOD PRESSURE: 100 MMHG | DIASTOLIC BLOOD PRESSURE: 70 MMHG | HEART RATE: 81 BPM | RESPIRATION RATE: 15 BRPM

## 2020-08-25 DIAGNOSIS — L65.9 ALOPECIA: ICD-10-CM

## 2020-08-25 DIAGNOSIS — Z00.00 PERIODIC HEALTH ASSESSMENT, GENERAL SCREENING, ADULT: Primary | ICD-10-CM

## 2020-08-25 PROCEDURE — 3008F BODY MASS INDEX DOCD: CPT | Performed by: FAMILY MEDICINE

## 2020-08-25 PROCEDURE — 1036F TOBACCO NON-USER: CPT | Performed by: FAMILY MEDICINE

## 2020-08-25 PROCEDURE — 99395 PREV VISIT EST AGE 18-39: CPT | Performed by: FAMILY MEDICINE

## 2020-08-25 PROCEDURE — 3725F SCREEN DEPRESSION PERFORMED: CPT | Performed by: FAMILY MEDICINE

## 2020-08-25 NOTE — ASSESSMENT & PLAN NOTE
Well adult  Overall the patient appears to be in stable health  He was given a refill on his finasteride medication for hair loss  He will repeat PSA testing next year

## 2020-08-25 NOTE — PROGRESS NOTES
FAMILY PRACTICE OFFICE VISIT       NAME: Joseph Blunt  AGE: 37 y o  SEX: male       : 1985        MRN: [id number]    DATE: 2020  TIME: 8:32 AM    Assessment and Plan     Problem List Items Addressed This Visit        Musculoskeletal and Integument    Alopecia       Other    Periodic health assessment, general screening, adult - Primary     Well adult  Overall the patient appears to be in stable health  He was given a refill on his finasteride medication for hair loss  He will repeat PSA testing next year  Chief Complaint     Chief Complaint   Patient presents with    Annual Exam       History of Present Illness     Patient in the office for annual wellness exam   Patient denies any recent illness  I reviewed his most recent blood test results  He does exercise on a regular basis  Last year ear he had to repeat an epidural injection by Pain Management for his intermittent back pain  He and his wife who are expecting their 1st child  She is 9 weeks pregnant therefore patient had held on his finasteride medication for the past 3 months  His last PSA was 2019      Review of Systems   Review of Systems   Constitutional: Negative  HENT: Negative  Eyes: Negative  Respiratory: Negative  Cardiovascular: Negative  Gastrointestinal: Negative  Genitourinary: Negative  Musculoskeletal: Negative  Skin: Negative  Allergic/Immunologic: Positive for environmental allergies  Neurological: Negative  Psychiatric/Behavioral: Negative          Active Problem List     Patient Active Problem List   Diagnosis    Chronic low back pain    Disc degeneration, lumbar    Lumbar disc herniation    Periodic health assessment, general screening, adult    Alopecia    Intervertebral disc disorder with radiculopathy of lumbar region    Patellofemoral dysfunction of left knee    Internal derangement of left knee    Anxiety       Past Medical History:  Past Medical History:   Diagnosis Date    Asthma     Esophageal reflux        Past Surgical History:  Past Surgical History:   Procedure Laterality Date    TOOTH EXTRACTION         Family History:  Family History   Problem Relation Age of Onset    No Known Problems Mother     Hyperlipidemia Father     Hypertension Father     Cancer Maternal Grandfather     Hypertension Paternal Grandmother     Hypertension Paternal Grandfather        Social History:  Social History     Socioeconomic History    Marital status: Single     Spouse name: Not on file    Number of children: Not on file    Years of education: Not on file    Highest education level: Not on file   Occupational History    Not on file   Social Needs    Financial resource strain: Not on file    Food insecurity     Worry: Not on file     Inability: Not on file    Transportation needs     Medical: Not on file     Non-medical: Not on file   Tobacco Use    Smoking status: Never Smoker    Smokeless tobacco: Never Used   Substance and Sexual Activity    Alcohol use: Yes     Comment: social    Drug use: No    Sexual activity: Yes   Lifestyle    Physical activity     Days per week: Not on file     Minutes per session: Not on file    Stress: Not on file   Relationships    Social connections     Talks on phone: Not on file     Gets together: Not on file     Attends [employer] service: Not on file     Active member of club or organization: Not on file     Attends meetings of clubs or organizations: Not on file     Relationship status: Not on file    Intimate partner violence     Fear of current or ex partner: Not on file     Emotionally abused: Not on file     Physically abused: Not on file     Forced sexual activity: Not on file   Other Topics Concern    Not on file   Social History Narrative    Not on file       Objective     Vitals:    08/25/20 0813   BP: 100/70   Pulse:    Resp:    Temp:    SpO2:      Wt Readings from Last 3 Encounters:   08/25/20 82 3 kg (181 lb 6 4 oz)   11/15/19 83 6 kg (184 lb 3 2 oz)   08/22/19 85 4 kg (188 lb 3 2 oz)       Physical Exam  Vitals signs and nursing note reviewed  Constitutional:       General: He is not in acute distress  Appearance: Normal appearance  He is well-developed  He is not ill-appearing  HENT:      Right Ear: Tympanic membrane, ear canal and external ear normal  There is no impacted cerumen  Left Ear: Tympanic membrane, ear canal and external ear normal  There is no impacted cerumen  Eyes:      General:         Right eye: No discharge  Left eye: No discharge  Extraocular Movements: Extraocular movements intact  Conjunctiva/sclera: Conjunctivae normal       Pupils: Pupils are equal, round, and reactive to light  Neck:      Musculoskeletal: Normal range of motion and neck supple  Thyroid: No thyromegaly  Vascular: No carotid bruit  Cardiovascular:      Rate and Rhythm: Normal rate and regular rhythm  Heart sounds: Normal heart sounds  No murmur  Pulmonary:      Effort: Pulmonary effort is normal  No respiratory distress  Breath sounds: Normal breath sounds  No wheezing or rales  Abdominal:      General: Bowel sounds are normal       Palpations: Abdomen is soft  Tenderness: There is no abdominal tenderness  There is no guarding or rebound  Comments: NO hepatospenomegaly   Musculoskeletal: Normal range of motion  Right lower leg: No edema  Left lower leg: No edema  Lymphadenopathy:      Cervical: No cervical adenopathy  Skin:     Comments: NO RASHES   Neurological:      General: No focal deficit present  Mental Status: He is alert and oriented to person, place, and time  Mental status is at baseline  Cranial Nerves: No cranial nerve deficit  Psychiatric:         Mood and Affect: Mood normal          Behavior: Behavior normal          Thought Content:  Thought content normal          Judgment: Judgment normal  Pertinent Laboratory/Diagnostic Studies:  Lab Results   Component Value Date    GLUCOSE 87 07/01/2015    BUN 20 08/21/2020    CREATININE 1 02 08/21/2020    CALCIUM 9 1 08/21/2020     07/01/2015    K 4 0 08/21/2020    CO2 31 08/21/2020     08/21/2020     Lab Results   Component Value Date    ALT 59 08/21/2020    AST 36 08/21/2020    ALKPHOS 73 08/21/2020    BILITOT 0 63 07/01/2015       Lab Results   Component Value Date    WBC 7 43 08/21/2020    HGB 16 0 08/21/2020    HCT 45 4 08/21/2020    MCV 91 08/21/2020     08/21/2020       No results found for: TSH    Lab Results   Component Value Date    CHOL 120 07/01/2015     Lab Results   Component Value Date    TRIG 131 08/21/2020     Lab Results   Component Value Date    HDL 49 08/21/2020     Lab Results   Component Value Date    LDLCALC 98 08/21/2020     No results found for: HGBA1C    Results for orders placed or performed in visit on 08/21/20   Comprehensive metabolic panel   Result Value Ref Range    Sodium 141 136 - 145 mmol/L    Potassium 4 0 3 5 - 5 3 mmol/L    Chloride 105 100 - 108 mmol/L    CO2 31 21 - 32 mmol/L    ANION GAP 5 4 - 13 mmol/L    BUN 20 5 - 25 mg/dL    Creatinine 1 02 0 60 - 1 30 mg/dL    Glucose, Fasting 72 65 - 99 mg/dL    Calcium 9 1 8 3 - 10 1 mg/dL    AST 36 5 - 45 U/L    ALT 59 12 - 78 U/L    Alkaline Phosphatase 73 46 - 116 U/L    Total Protein 7 5 6 4 - 8 2 g/dL    Albumin 4 5 3 5 - 5 0 g/dL    Total Bilirubin 0 68 0 20 - 1 00 mg/dL    eGFR 94 ml/min/1 73sq m   Lipid panel   Result Value Ref Range    Cholesterol 173 50 - 200 mg/dL    Triglycerides 131 <=150 mg/dL    HDL, Direct 49 >=40 mg/dL    LDL Calculated 98 0 - 100 mg/dL    Non-HDL-Chol (CHOL-HDL) 124 mg/dl   TSH, 3rd generation   Result Value Ref Range    TSH 3RD GENERATON 2 280 0 358 - 3 740 uIU/mL   CBC   Result Value Ref Range    WBC 7 43 4 31 - 10 16 Thousand/uL    RBC 4 98 3 88 - 5 62 Million/uL    Hemoglobin 16 0 12 0 - 17 0 g/dL    Hematocrit 45 4 36 5 - 49 3 %    MCV 91 82 - 98 fL    MCH 32 1 26 8 - 34 3 pg    MCHC 35 2 31 4 - 37 4 g/dL    RDW 12 4 11 6 - 15 1 %    Platelets 370 965 - 901 Thousands/uL    MPV 10 0 8 9 - 12 7 fL       No orders of the defined types were placed in this encounter  ALLERGIES:  No Known Allergies    Current Medications     Current Outpatient Medications   Medication Sig Dispense Refill    albuterol (PROVENTIL HFA,VENTOLIN HFA) 90 mcg/act inhaler Inhale 2 puffs every 4 (four) hours as needed (as needed) 1 Inhaler 3    cetirizine (ZYRTEC ALLERGY) 10 mg tablet Take 10 mg by mouth daily      multivitamin (THERAGRAN) TABS Take 1 tablet by mouth daily      naproxen (NAPROSYN) 500 mg tablet Take 1 tablet by mouth as needed      ranitidine (ZANTAC) 150 mg tablet Take 150 mg by mouth 2 (two) times a day      finasteride (PROPECIA) 1 MG tablet Take 1 tablet (1 mg total) by mouth daily (Patient not taking: Reported on 8/25/2020) 90 tablet 1     No current facility-administered medications for this visit            Health Maintenance     Health Maintenance   Topic Date Due    BMI: Followup Plan  10/12/2001    DTaP,Tdap,and Td Vaccines (2 - Tdap) 10/12/2004    PT PLAN OF CARE  07/25/2019    Influenza Vaccine  07/01/2020    Annual Physical  11/15/2020    Depression Screening PHQ  08/25/2021    BMI: Adult  08/25/2021    HIV Screening  Completed    Hepatitis C Screening  Completed    Pneumococcal Vaccine: Pediatrics (0 to 5 Years) and At-Risk Patients (6 to 59 Years)  Aged Out    HIB Vaccine  Aged Out    Hepatitis B Vaccine  Aged Out    IPV Vaccine  Aged Out    Hepatitis A Vaccine  Aged Out    Meningococcal ACWY Vaccine  Aged Out    HPV Vaccine  Aged [employer] History   Administered Date(s) Administered    Meningococcal Polysaccharide (MPSV4) 07/17/2002    Td (adult), adsorbed 85/56/1437       Zhanna Bills MD

## 2020-11-06 ENCOUNTER — HOSPITAL ENCOUNTER (EMERGENCY)
Facility: HOSPITAL | Age: 35
Discharge: HOME | End: 2020-11-06
Attending: EMERGENCY MEDICINE | Admitting: EMERGENCY MEDICINE
Payer: COMMERCIAL

## 2020-11-06 VITALS
HEART RATE: 82 BPM | OXYGEN SATURATION: 100 % | TEMPERATURE: 97.9 F | SYSTOLIC BLOOD PRESSURE: 116 MMHG | RESPIRATION RATE: 18 BRPM | DIASTOLIC BLOOD PRESSURE: 67 MMHG

## 2020-11-06 DIAGNOSIS — Z20.822 EXPOSURE TO COVID-19 VIRUS: Primary | ICD-10-CM

## 2020-11-06 DIAGNOSIS — J06.9 URI (UPPER RESPIRATORY INFECTION): Primary | ICD-10-CM

## 2020-11-06 LAB — SARS-COV-2 RNA RESP QL NAA+PROBE: NEGATIVE

## 2020-11-06 PROCEDURE — 99282 EMERGENCY DEPT VISIT SF MDM: CPT | Performed by: EMERGENCY MEDICINE

## 2020-11-06 PROCEDURE — 99283 EMERGENCY DEPT VISIT LOW MDM: CPT

## 2020-11-06 PROCEDURE — 87635 SARS-COV-2 COVID-19 AMP PRB: CPT | Performed by: EMERGENCY MEDICINE

## 2020-12-09 ENCOUNTER — OFFICE VISIT (OUTPATIENT)
Dept: PAIN MEDICINE | Facility: CLINIC | Age: 35
End: 2020-12-09
Payer: COMMERCIAL

## 2020-12-09 ENCOUNTER — TRANSCRIBE ORDERS (OUTPATIENT)
Dept: PAIN MEDICINE | Facility: CLINIC | Age: 35
End: 2020-12-09

## 2020-12-09 VITALS
RESPIRATION RATE: 16 BRPM | SYSTOLIC BLOOD PRESSURE: 102 MMHG | TEMPERATURE: 97.6 F | HEIGHT: 69 IN | HEART RATE: 68 BPM | BODY MASS INDEX: 27.55 KG/M2 | DIASTOLIC BLOOD PRESSURE: 66 MMHG | WEIGHT: 186 LBS

## 2020-12-09 DIAGNOSIS — G89.29 CHRONIC BILATERAL LOW BACK PAIN WITHOUT SCIATICA: ICD-10-CM

## 2020-12-09 DIAGNOSIS — M51.16 INTERVERTEBRAL DISC DISORDER WITH RADICULOPATHY OF LUMBAR REGION: ICD-10-CM

## 2020-12-09 DIAGNOSIS — M54.50 CHRONIC BILATERAL LOW BACK PAIN WITHOUT SCIATICA: ICD-10-CM

## 2020-12-09 DIAGNOSIS — G89.4 CHRONIC PAIN SYNDROME: Primary | ICD-10-CM

## 2020-12-09 DIAGNOSIS — M51.26 LUMBAR DISC HERNIATION: ICD-10-CM

## 2020-12-09 PROCEDURE — 1036F TOBACCO NON-USER: CPT | Performed by: NURSE PRACTITIONER

## 2020-12-09 PROCEDURE — 3008F BODY MASS INDEX DOCD: CPT | Performed by: NURSE PRACTITIONER

## 2020-12-09 PROCEDURE — 99214 OFFICE O/P EST MOD 30 MIN: CPT | Performed by: NURSE PRACTITIONER

## 2020-12-21 ENCOUNTER — IMMUNIZATIONS (OUTPATIENT)
Dept: FAMILY MEDICINE CLINIC | Facility: HOSPITAL | Age: 35
End: 2020-12-21
Payer: COMMERCIAL

## 2020-12-21 ENCOUNTER — TELEPHONE (OUTPATIENT)
Dept: PAIN MEDICINE | Facility: MEDICAL CENTER | Age: 35
End: 2020-12-21

## 2020-12-21 DIAGNOSIS — Z23 ENCOUNTER FOR IMMUNIZATION: ICD-10-CM

## 2020-12-21 PROCEDURE — 91300 SARS-COV-2 / COVID-19 MRNA VACCINE (PFIZER-BIONTECH) 30 MCG: CPT

## 2020-12-21 PROCEDURE — 0001A SARS-COV-2 / COVID-19 MRNA VACCINE (PFIZER-BIONTECH) 30 MCG: CPT

## 2021-01-11 ENCOUNTER — IMMUNIZATIONS (OUTPATIENT)
Dept: FAMILY MEDICINE CLINIC | Facility: HOSPITAL | Age: 36
End: 2021-01-11

## 2021-01-11 DIAGNOSIS — Z23 ENCOUNTER FOR IMMUNIZATION: ICD-10-CM

## 2021-01-11 PROCEDURE — 91300 SARS-COV-2 / COVID-19 MRNA VACCINE (PFIZER-BIONTECH) 30 MCG: CPT

## 2021-01-11 PROCEDURE — 0002A SARS-COV-2 / COVID-19 MRNA VACCINE (PFIZER-BIONTECH) 30 MCG: CPT

## 2021-01-20 ENCOUNTER — TELEPHONE (OUTPATIENT)
Dept: FAMILY MEDICINE CLINIC | Facility: CLINIC | Age: 36
End: 2021-01-20

## 2021-01-20 NOTE — TELEPHONE ENCOUNTER
Patient called he needs t-dap vaccine, due to his wife being pregnant he needs to get it  Is this ok to schedule

## 2021-01-26 ENCOUNTER — HOSPITAL ENCOUNTER (OUTPATIENT)
Dept: RADIOLOGY | Facility: CLINIC | Age: 36
Discharge: HOME | End: 2021-01-26
Attending: ANESTHESIOLOGY | Admitting: ANESTHESIOLOGY
Payer: COMMERCIAL

## 2021-01-26 VITALS
TEMPERATURE: 98.2 F | SYSTOLIC BLOOD PRESSURE: 110 MMHG | RESPIRATION RATE: 18 BRPM | HEART RATE: 76 BPM | OXYGEN SATURATION: 100 % | DIASTOLIC BLOOD PRESSURE: 73 MMHG

## 2021-01-26 DIAGNOSIS — M51.16 INTERVERTEBRAL DISC DISORDER WITH RADICULOPATHY OF LUMBAR REGION: ICD-10-CM

## 2021-01-26 PROCEDURE — 64483 NJX AA&/STRD TFRM EPI L/S 1: CPT | Performed by: ANESTHESIOLOGY

## 2021-01-26 RX ORDER — BUPIVACAINE HCL/PF 2.5 MG/ML
10 VIAL (ML) INJECTION ONCE
Status: COMPLETED | OUTPATIENT
Start: 2021-01-26 | End: 2021-01-26

## 2021-01-26 RX ORDER — 0.9 % SODIUM CHLORIDE 0.9 %
10 VIAL (ML) INJECTION ONCE
Status: COMPLETED | OUTPATIENT
Start: 2021-01-26 | End: 2021-01-26

## 2021-01-26 RX ORDER — METHYLPREDNISOLONE ACETATE 80 MG/ML
80 INJECTION, SUSPENSION INTRA-ARTICULAR; INTRALESIONAL; INTRAMUSCULAR; PARENTERAL; SOFT TISSUE ONCE
Status: COMPLETED | OUTPATIENT
Start: 2021-01-26 | End: 2021-01-26

## 2021-01-26 RX ADMIN — SODIUM CHLORIDE 5 ML: 9 INJECTION, SOLUTION INTRAMUSCULAR; INTRAVENOUS; SUBCUTANEOUS at 08:35

## 2021-01-26 RX ADMIN — IOHEXOL 1 ML: 300 INJECTION, SOLUTION INTRAVENOUS at 08:37

## 2021-01-26 RX ADMIN — METHYLPREDNISOLONE ACETATE 80 MG: 80 INJECTION, SUSPENSION INTRA-ARTICULAR; INTRALESIONAL; INTRAMUSCULAR; PARENTERAL; SOFT TISSUE at 08:37

## 2021-01-26 RX ADMIN — BUPIVACAINE HYDROCHLORIDE 2 ML: 2.5 INJECTION, SOLUTION EPIDURAL; INFILTRATION; INTRACAUDAL at 08:37

## 2021-01-26 RX ADMIN — Medication 5 ML: at 08:35

## 2021-01-26 NOTE — DISCHARGE INSTR - LAB
Epidural Steroid Injection   WHAT YOU NEED TO KNOW:   An epidural steroid injection (DYANA) is a procedure to inject steroid medicine into the epidural space  The epidural space is between your spinal cord and vertebrae  Steroids reduce inflammation and fluid buildup in your spine that may be causing pain  You may be given pain medicine along with the steroids  ACTIVITY  · Do not drive or operate machinery today  · No strenuous activity today - bending, lifting, etc   · You may resume normal activites starting tomorrow - start slowly and as tolerated  · You may shower today, but no tub baths or hot tubs  · You may have numbness for several hours from the local anesthetic  Please use caution and common sense, especially with weight-bearing activities  CARE OF THE INJECTION SITE  · If you have soreness or pain, apply ice to the area today (20 minutes on/20 minutes off)  · Starting tomorrow, you may use warm, moist heat or ice if needed  · You may have an increase or change in your discomfort for 36-48 hours after your treatment  · Apply ice and continue with any pain medication you have been prescribed  · [facility] if you have any of the following: redness, drainage, swelling, headache, stiff neck or fever above 100°F     SPECIAL INSTRUCTIONS  · Our office will contact you in approximately 7 days for a progress report  MEDICATIONS  · Continue to take all routine medications  · Our office may have instructed you to hold some medications  If you have a problem specifically related to your procedure, please call our office at [phone]  Problems not related to your procedure should be directed to your primary care physician

## 2021-01-26 NOTE — H&P
History of Present Illness: The patient is a 38 y o  male who presents with complaints of lower back and leg pain secondary lumbar disc bulging is here today for bilateral L4 transforaminal epidural steroid injection      Patient Active Problem List   Diagnosis    Chronic low back pain    Disc degeneration, lumbar    Lumbar disc herniation    Periodic health assessment, general screening, adult    Alopecia    Intervertebral disc disorder with radiculopathy of lumbar region    Patellofemoral dysfunction of left knee    Internal derangement of left knee    Anxiety       Past Medical History:   Diagnosis Date    Asthma     Esophageal reflux        Past Surgical History:   Procedure Laterality Date    TOOTH EXTRACTION           Current Outpatient Medications:     albuterol (PROVENTIL HFA,VENTOLIN HFA) 90 mcg/act inhaler, Inhale 2 puffs every 4 (four) hours as needed (as needed), Disp: 1 Inhaler, Rfl: 3    cetirizine (ZYRTEC ALLERGY) 10 mg tablet, Take 10 mg by mouth daily, Disp: , Rfl:     finasteride (PROPECIA) 1 MG tablet, Take 1 tablet (1 mg total) by mouth daily, Disp: 90 tablet, Rfl: 1    multivitamin (THERAGRAN) TABS, Take 1 tablet by mouth daily, Disp: , Rfl:     naproxen (NAPROSYN) 500 mg tablet, Take 1 tablet by mouth as needed, Disp: , Rfl:     ranitidine (ZANTAC) 150 mg tablet, Take 150 mg by mouth 2 (two) times a day, Disp: , Rfl:     Current Facility-Administered Medications:     bupivacaine (PF) (MARCAINE) 0 25 % injection 10 mL, 10 mL, Epidural, Once, Dagoberto Cervantes MD    iohexol (OMNIPAQUE) 300 mg/mL injection 50 mL, 50 mL, Epidural, Once, Dagoberto Cervantes MD    lidocaine (PF) (XYLOCAINE-MPF) 2 % injection 5 mL, 5 mL, Infiltration, Once, Dagoberto Cervantes MD    methylPREDNISolone acetate (DEPO-MEDROL) injection 80 mg, 80 mg, Epidural, Once, Dagoberto Cervantes MD    sodium chloride (PF) 0 9 % injection 10 mL, 10 mL, Infiltration, Once, Dagoberto Cervantes MD    No Known Allergies    Physical Exam:   Vitals:    01/26/21 0816   BP: 99/62   Pulse: 60   Resp: 20   Temp: 98 2 °F (36 8 °C)   SpO2: 99%     General: Awake, Alert, Oriented x 3, Mood and affect appropriate  Respiratory: Respirations even and unlabored  Cardiovascular: Peripheral pulses intact; no edema  Musculoskeletal Exam:  Lower back tenderness    ASA Score: 1    Patient/Chart Verification  Patient ID Verified: Verbal  Consents Confirmed: To be obtained in the Pre-Procedure area  H&P( within 30 days) Verified: To be obtained in the Pre-Procedure area  Allergies Reviewed: Yes  Anticoag/NSAID held?: NA  Currently on antibiotics?: No    Assessment:   1   Intervertebral disc disorder with radiculopathy of lumbar region        Plan: bilateral L4 TFESI

## 2021-02-02 ENCOUNTER — TELEPHONE (OUTPATIENT)
Dept: PAIN MEDICINE | Facility: CLINIC | Age: 36
End: 2021-02-02

## 2021-02-10 ENCOUNTER — CLINICAL SUPPORT (OUTPATIENT)
Dept: FAMILY MEDICINE CLINIC | Facility: CLINIC | Age: 36
End: 2021-02-10
Payer: COMMERCIAL

## 2021-02-10 VITALS — TEMPERATURE: 97.5 F

## 2021-02-10 DIAGNOSIS — Z23 ENCOUNTER FOR IMMUNIZATION: Primary | ICD-10-CM

## 2021-02-10 PROCEDURE — 90471 IMMUNIZATION ADMIN: CPT

## 2021-02-10 PROCEDURE — 90715 TDAP VACCINE 7 YRS/> IM: CPT

## 2021-03-03 DIAGNOSIS — L65.9 ALOPECIA: ICD-10-CM

## 2021-03-04 RX ORDER — FINASTERIDE 1 MG/1
1 TABLET, FILM COATED ORAL DAILY
Qty: 90 TABLET | Refills: 0 | Status: SHIPPED | OUTPATIENT
Start: 2021-03-04 | End: 2021-06-28 | Stop reason: SDUPTHER

## 2021-06-10 ENCOUNTER — TELEPHONE (OUTPATIENT)
Dept: RADIOLOGY | Facility: CLINIC | Age: 36
End: 2021-06-10

## 2021-06-10 DIAGNOSIS — M51.16 INTERVERTEBRAL DISC DISORDER WITH RADICULOPATHY OF LUMBAR REGION: Primary | ICD-10-CM

## 2021-06-10 NOTE — TELEPHONE ENCOUNTER
Spoke to patient who is reporting worsening lbp systems on the left due to increased activity level  Last epidural injection provided >80% relief for more than 4 months  Please schedule patient for left L4-5 TF DYANA for Tuesday 6/15 @ 2:30pm   Order placed

## 2021-06-11 NOTE — TELEPHONE ENCOUNTER
Scheduled pt for Lt L4 L5 Tfesi for 6/15/21  Pt denies rx blood thinners  Went over pre-procedure instructions below:  Nothing to eat or drink 1 hr prior to procedure  Need to arrange transportation  Proper clothing for procedure  If ill or placed on antibiotics please call to reschedule  Covid/travel/ and vaccine instructions

## 2021-06-15 ENCOUNTER — HOSPITAL ENCOUNTER (OUTPATIENT)
Dept: RADIOLOGY | Facility: CLINIC | Age: 36
Discharge: HOME | End: 2021-06-15
Attending: ANESTHESIOLOGY | Admitting: ANESTHESIOLOGY
Payer: COMMERCIAL

## 2021-06-15 VITALS
HEART RATE: 80 BPM | OXYGEN SATURATION: 98 % | TEMPERATURE: 97.7 F | RESPIRATION RATE: 18 BRPM | SYSTOLIC BLOOD PRESSURE: 128 MMHG | DIASTOLIC BLOOD PRESSURE: 74 MMHG

## 2021-06-15 DIAGNOSIS — M51.16 INTERVERTEBRAL DISC DISORDER WITH RADICULOPATHY OF LUMBAR REGION: ICD-10-CM

## 2021-06-15 RX ORDER — BUPIVACAINE HCL/PF 2.5 MG/ML
10 VIAL (ML) INJECTION ONCE
Status: COMPLETED | OUTPATIENT
Start: 2021-06-15 | End: 2021-06-15

## 2021-06-15 RX ORDER — METHYLPREDNISOLONE ACETATE 80 MG/ML
80 INJECTION, SUSPENSION INTRA-ARTICULAR; INTRALESIONAL; INTRAMUSCULAR; PARENTERAL; SOFT TISSUE ONCE
Status: COMPLETED | OUTPATIENT
Start: 2021-06-15 | End: 2021-06-15

## 2021-06-15 RX ORDER — 0.9 % SODIUM CHLORIDE 0.9 %
10 VIAL (ML) INJECTION ONCE
Status: COMPLETED | OUTPATIENT
Start: 2021-06-15 | End: 2021-06-15

## 2021-06-15 RX ADMIN — Medication 4 ML: at 14:44

## 2021-06-15 RX ADMIN — METHYLPREDNISOLONE ACETATE 80 MG: 80 INJECTION, SUSPENSION INTRA-ARTICULAR; INTRALESIONAL; INTRAMUSCULAR; PARENTERAL; SOFT TISSUE at 14:47

## 2021-06-15 RX ADMIN — BUPIVACAINE HYDROCHLORIDE 2 ML: 2.5 INJECTION, SOLUTION EPIDURAL; INFILTRATION; INTRACAUDAL at 14:47

## 2021-06-15 RX ADMIN — IOHEXOL 1 ML: 300 INJECTION, SOLUTION INTRAVENOUS at 14:46

## 2021-06-15 RX ADMIN — SODIUM CHLORIDE 4 ML: 9 INJECTION, SOLUTION INTRAMUSCULAR; INTRAVENOUS; SUBCUTANEOUS at 14:44

## 2021-06-15 NOTE — H&P
History of Present Illness: The patient is a 38 y o  male who presents with complaints of left lower back pain secondary lumbar disc bulging is here today for left L4-5 transforaminal epidural steroid injection  Patient Active Problem List   Diagnosis    Chronic low back pain    Disc degeneration, lumbar    Lumbar disc herniation    Periodic health assessment, general screening, adult    Alopecia    Intervertebral disc disorder with radiculopathy of lumbar region    Patellofemoral dysfunction of left knee    Internal derangement of left knee    Anxiety       Past Medical History:   Diagnosis Date    Asthma     Esophageal reflux        Past Surgical History:   Procedure Laterality Date    TOOTH EXTRACTION           Current Outpatient Medications:     albuterol (PROVENTIL HFA,VENTOLIN HFA) 90 mcg/act inhaler, Inhale 2 puffs every 4 (four) hours as needed (as needed), Disp: 1 Inhaler, Rfl: 3    cetirizine (ZYRTEC ALLERGY) 10 mg tablet, Take 10 mg by mouth daily, Disp: , Rfl:     finasteride (PROPECIA) 1 MG tablet, Take 1 tablet (1 mg total) by mouth daily, Disp: 90 tablet, Rfl: 0    multivitamin (THERAGRAN) TABS, Take 1 tablet by mouth daily, Disp: , Rfl:     naproxen (NAPROSYN) 500 mg tablet, Take 1 tablet by mouth as needed, Disp: , Rfl:     ranitidine (ZANTAC) 150 mg tablet, Take 150 mg by mouth 2 (two) times a day, Disp: , Rfl:   No current facility-administered medications for this encounter  No Known Allergies    Physical Exam:   Vitals:    06/15/21 1434   BP: 122/76   Pulse: 83   Resp: 20   Temp: 97 7 °F (36 5 °C)   SpO2: 98%     General: Awake, Alert, Oriented x 3, Mood and affect appropriate  Respiratory: Respirations even and unlabored  Cardiovascular: Peripheral pulses intact; no edema  Musculoskeletal Exam:  Left lower back tenderness    ASA Score: 1    Patient/Chart Verification  Patient ID Verified: Verbal  Consents Confirmed:  To be obtained in the Pre-Procedure area  H&P( within 30 days) Verified: To be obtained in the Pre-Procedure area  Interval H&P(within 24 hr) Complete (required for Outpatients and Surgery Admit only): To be obtained in the Pre-Procedure area  Allergies Reviewed: Yes  Anticoag/NSAID held?: NA  Currently on antibiotics?: No    Assessment:   1   Intervertebral disc disorder with radiculopathy of lumbar region        Plan: Left L4-5 TF DYANA

## 2021-06-15 NOTE — DISCHARGE INSTR - LAB
Epidural Steroid Injection   WHAT YOU NEED TO KNOW:   An epidural steroid injection (DYANA) is a procedure to inject steroid medicine into the epidural space  The epidural space is between your spinal cord and vertebrae  Steroids reduce inflammation and fluid buildup in your spine that may be causing pain  You may be given pain medicine along with the steroids  ACTIVITY  · Do not drive or operate machinery today  · No strenuous activity today - bending, lifting, etc   · You may resume normal activites starting tomorrow - start slowly and as tolerated  · You may shower today, but no tub baths or hot tubs  · You may have numbness for several hours from the local anesthetic  Please use caution and common sense, especially with weight-bearing activities  CARE OF THE INJECTION SITE  · If you have soreness or pain, apply ice to the area today (20 minutes on/20 minutes off)  · Starting tomorrow, you may use warm, moist heat or ice if needed  · You may have an increase or change in your discomfort for 36-48 hours after your treatment  · Apply ice and continue with any pain medication you have been prescribed  · [facility] if you have any of the following: redness, drainage, swelling, headache, stiff neck or fever above 100°F     SPECIAL INSTRUCTIONS  · Our office will contact you in approximately 7 days for a progress report  MEDICATIONS  · Continue to take all routine medications  · Our office may have instructed you to hold some medications  As no general anesthesia was used in today's procedure, you should not experience any side effects related to anesthesia  If you have a problem specifically related to your procedure, please call our office at [phone]  Problems not related to your procedure should be directed to your primary care physician

## 2021-06-22 ENCOUNTER — TELEPHONE (OUTPATIENT)
Dept: PAIN MEDICINE | Facility: CLINIC | Age: 36
End: 2021-06-22

## 2021-06-28 DIAGNOSIS — L65.9 ALOPECIA: ICD-10-CM

## 2021-06-28 RX ORDER — FINASTERIDE 1 MG/1
1 TABLET, FILM COATED ORAL DAILY
Qty: 90 TABLET | Refills: 0 | Status: SHIPPED | OUTPATIENT
Start: 2021-06-28 | End: 2021-10-15

## 2021-06-28 NOTE — TELEPHONE ENCOUNTER
Pt pharmacy requesting refill   Requested Prescriptions     Pending Prescriptions Disp Refills    finasteride (PROPECIA) 1 MG tablet 90 tablet 0     Sig: Take 1 tablet (1 mg total) by mouth daily

## 2021-09-16 ENCOUNTER — OFFICE VISIT (OUTPATIENT)
Dept: FAMILY MEDICINE CLINIC | Facility: CLINIC | Age: 36
End: 2021-09-16
Payer: COMMERCIAL

## 2021-09-16 VITALS
RESPIRATION RATE: 16 BRPM | HEIGHT: 69 IN | SYSTOLIC BLOOD PRESSURE: 122 MMHG | DIASTOLIC BLOOD PRESSURE: 66 MMHG | HEART RATE: 88 BPM | BODY MASS INDEX: 27.16 KG/M2 | WEIGHT: 183.4 LBS | TEMPERATURE: 97.8 F | OXYGEN SATURATION: 98 %

## 2021-09-16 DIAGNOSIS — G44.89 OTHER HEADACHE SYNDROME: ICD-10-CM

## 2021-09-16 DIAGNOSIS — R35.1 NOCTURIA: ICD-10-CM

## 2021-09-16 DIAGNOSIS — L65.9 ALOPECIA: ICD-10-CM

## 2021-09-16 DIAGNOSIS — Z00.00 PERIODIC HEALTH ASSESSMENT, GENERAL SCREENING, ADULT: Primary | ICD-10-CM

## 2021-09-16 PROCEDURE — 99395 PREV VISIT EST AGE 18-39: CPT | Performed by: FAMILY MEDICINE

## 2021-09-16 PROCEDURE — 3008F BODY MASS INDEX DOCD: CPT | Performed by: ANESTHESIOLOGY

## 2021-09-16 PROCEDURE — 3725F SCREEN DEPRESSION PERFORMED: CPT | Performed by: FAMILY MEDICINE

## 2021-09-16 NOTE — ASSESSMENT & PLAN NOTE
Hair loss  Patient continues to use finasteride 1 mg daily  He will obtain blood work for PSA testing

## 2021-09-16 NOTE — ASSESSMENT & PLAN NOTE
Headache  Patient will have proceed with eye exam to check for a any visual disturbance that could be causing his headaches  He will track the progress of his headaches as well as frequency and call if they persist   He may use Tylenol as needed since this appears to help with his headache    We discussed the fact that his headaches do not follow a particular pattern for any serious condition

## 2021-09-16 NOTE — ASSESSMENT & PLAN NOTE
Well adult  Overall the patient appears to be in stable health  He will obtain blood work as ordered for further evaluation     We will make further recommendations pending results of test  His COVID vaccination is up-to-date

## 2021-09-16 NOTE — PROGRESS NOTES
FAMILY PRACTICE OFFICE VISIT       NAME: Jovana Niño  AGE: 38 y o  SEX: male       : 1985        MRN: [id number]    DATE: 2021  TIME: 10:30 AM    Assessment and Plan     Problem List Items Addressed This Visit        Musculoskeletal and Integument    Alopecia      Hair loss  Patient continues to use finasteride 1 mg daily  He will obtain blood work for PSA testing  Relevant Orders    Comprehensive metabolic panel    Lipid panel    TSH, 3rd generation       Other    Periodic health assessment, general screening, adult - Primary      Well adult  Overall the patient appears to be in stable health  He will obtain blood work as ordered for further evaluation  We will make further recommendations pending results of test  His COVID vaccination is up-to-date         Relevant Orders    Comprehensive metabolic panel    Lipid panel    TSH, 3rd generation    PSA, Total Screen    Other headache syndrome      Headache  Patient will have proceed with eye exam to check for a any visual disturbance that could be causing his headaches  He will track the progress of his headaches as well as frequency and call if they persist   He may use Tylenol as needed since this appears to help with his headache  We discussed the fact that his headaches do not follow a particular pattern for any serious condition           Other Visit Diagnoses     Nocturia        Relevant Orders    PSA, Total Screen          BMI Counseling: Body mass index is 27 08 kg/m²  The BMI is above normal  Nutrition recommendations include decreasing portion sizes and moderation in carbohydrate intake  Exercise recommendations include exercising 3-5 times per week  No pharmacotherapy was ordered  Patient referred to PCP  Rationale for BMI follow-up plan is due to patient being overweight or obese  Depression Screening and Follow-up Plan:   Patient was screened for depression during today's encounter   They screened negative with a PHQ-2 score of 0  Chief Complaint     Chief Complaint   Patient presents with    Physical Exam     Annual Well Exam       History of Present Illness      Patient the office for annual wellness exam   He denies any recent illness  Patient had 2 epidural injections this past year by Pain Management but currently feels stable with his back pain and has been able to exercise on regular basis  He denies any significant changes in weight  His COVID vaccination is up-to-date  He lives with his wife and his 10month-old son  He is a nonsmoker  He does describe several weeks history of intermittent headaches which at times occur more later in the day  Occasionally he will take Tylenol which resolves headache within an hour  He denies any nausea or vomiting  He has no changes in vision or aura's  Review of Systems   Review of Systems   Constitutional: Negative  HENT: Negative  Eyes: Negative  Respiratory: Negative  Cardiovascular: Negative  Gastrointestinal: Negative  Genitourinary: Negative  Musculoskeletal: Positive for arthralgias and back pain  Skin: Negative  Neurological: Positive for headaches  Psychiatric/Behavioral: Negative          Active Problem List     Patient Active Problem List   Diagnosis    Chronic low back pain    Disc degeneration, lumbar    Lumbar disc herniation    Periodic health assessment, general screening, adult    Alopecia    Intervertebral disc disorder with radiculopathy of lumbar region    Patellofemoral dysfunction of left knee    Internal derangement of left knee    Anxiety    Other headache syndrome       Past Medical History:  Past Medical History:   Diagnosis Date    Asthma     Esophageal reflux        Past Surgical History:  Past Surgical History:   Procedure Laterality Date    TOOTH EXTRACTION         Family History:  Family History   Problem Relation Age of Onset    No Known Problems Mother     Hyperlipidemia Father    Nany Bhatt Hypertension Father     Cancer Maternal Grandfather     Hypertension Paternal Grandmother     Hypertension Paternal Grandfather        Social History:  Social History     Socioeconomic History    Marital status: /Civil Union     Spouse name: Not on file    Number of children: Not on file    Years of education: Not on file    Highest education level: Not on file   Occupational History    Not on file   Tobacco Use    Smoking status: Never Smoker    Smokeless tobacco: Never Used   Vaping Use    Vaping Use: Never used   Substance and Sexual Activity    Alcohol use: Yes     Comment: social    Drug use: No    Sexual activity: Yes   Other Topics Concern    Not on file   Social History Narrative    Not on file     Social Determinants of Health     Financial Resource Strain:     Difficulty of Paying Living Expenses:    Food Insecurity:     Worried About Running Out of Food in the Last Year:     Ran Out of Food in the Last Year:    Transportation Needs:     Lack of Transportation (Medical):      Lack of Transportation (Non-Medical):    Physical Activity:     Days of Exercise per Week:     Minutes of Exercise per Session:    Stress:     Feeling of Stress :    Social Connections:     Frequency of Communication with Friends and Family:     Frequency of Social Gatherings with Friends and Family:     Attends Yarsani Services:     Active Member of Clubs or Organizations:     Attends Club or Organization Meetings:     Marital Status:    Intimate Partner Violence:     Fear of Current or Ex-Partner:     Emotionally Abused:     Physically Abused:     Sexually Abused:        Objective     Vitals:    09/16/21 0958   BP: 122/66   Pulse: 88   Resp: 16   Temp: 97 8 °F (36 6 °C)   SpO2: 98%     Wt Readings from Last 3 Encounters:   09/16/21 83 2 kg (183 lb 6 4 oz)   12/09/20 84 4 kg (186 lb)   08/25/20 82 3 kg (181 lb 6 4 oz)       Physical Exam  Constitutional:       General: He is not in acute distress  Appearance: Normal appearance  He is not ill-appearing  HENT:      Head: Normocephalic and atraumatic  Eyes:      General:         Right eye: No discharge  Left eye: No discharge  Extraocular Movements: Extraocular movements intact  Conjunctiva/sclera: Conjunctivae normal       Pupils: Pupils are equal, round, and reactive to light  Neck:      Vascular: No carotid bruit  Cardiovascular:      Rate and Rhythm: Normal rate and regular rhythm  Heart sounds: Normal heart sounds  No murmur heard  Pulmonary:      Effort: Pulmonary effort is normal       Breath sounds: Normal breath sounds  No wheezing, rhonchi or rales  Abdominal:      General: Abdomen is flat  Bowel sounds are normal  There is no distension  Palpations: Abdomen is soft  Tenderness: There is no abdominal tenderness  There is no guarding or rebound  Musculoskeletal:      Right lower leg: No edema  Left lower leg: No edema  Lymphadenopathy:      Cervical: No cervical adenopathy  Skin:     Findings: No rash  Neurological:      General: No focal deficit present  Mental Status: He is alert and oriented to person, place, and time  Cranial Nerves: No cranial nerve deficit  Psychiatric:         Mood and Affect: Mood normal          Behavior: Behavior normal          Thought Content:  Thought content normal          Judgment: Judgment normal          Pertinent Laboratory/Diagnostic Studies:  Lab Results   Component Value Date    GLUCOSE 87 07/01/2015    BUN 20 08/21/2020    CREATININE 1 02 08/21/2020    CALCIUM 9 1 08/21/2020     07/01/2015    K 4 0 08/21/2020    CO2 31 08/21/2020     08/21/2020     Lab Results   Component Value Date    ALT 59 08/21/2020    AST 36 08/21/2020    ALKPHOS 73 08/21/2020    BILITOT 0 63 07/01/2015       Lab Results   Component Value Date    WBC 7 43 08/21/2020    HGB 16 0 08/21/2020    HCT 45 4 08/21/2020    MCV 91 08/21/2020     08/21/2020       No results found for: TSH    Lab Results   Component Value Date    CHOL 120 07/01/2015     Lab Results   Component Value Date    TRIG 131 08/21/2020     Lab Results   Component Value Date    HDL 49 08/21/2020     Lab Results   Component Value Date    LDLCALC 98 08/21/2020     No results found for: HGBA1C    Results for orders placed or performed during the hospital encounter of 11/06/20   Novel Coronavirus (Covid-19),PCR [facility]    Specimen: Nose; Nasopharyngeal   Result Value Ref Range    SARS-CoV-2 Negative Negative       Orders Placed This Encounter   Procedures    Comprehensive metabolic panel    Lipid panel    TSH, 3rd generation    PSA, Total Screen       ALLERGIES:  No Known Allergies    Current Medications     Current Outpatient Medications   Medication Sig Dispense Refill    albuterol (PROVENTIL HFA,VENTOLIN HFA) 90 mcg/act inhaler Inhale 2 puffs every 4 (four) hours as needed (as needed) 1 Inhaler 3    cetirizine (ZYRTEC ALLERGY) 10 mg tablet Take 10 mg by mouth daily      finasteride (PROPECIA) 1 MG tablet Take 1 tablet (1 mg total) by mouth daily 90 tablet 0    multivitamin (THERAGRAN) TABS Take 1 tablet by mouth daily      naproxen (NAPROSYN) 500 mg tablet Take 1 tablet by mouth as needed      ranitidine (ZANTAC) 150 mg tablet Take 150 mg by mouth 2 (two) times a day       No current facility-administered medications for this visit           Health Maintenance     Health Maintenance   Topic Date Due    BMI: Followup Plan  Never done    PT PLAN OF CARE  07/25/2019    Annual Physical  08/25/2021    Influenza Vaccine (1) 09/01/2021    BMI: Adult  12/09/2021    Depression Screening  09/16/2022    DTaP,Tdap,and Td Vaccines (3 - Td or Tdap) 02/10/2031    HIV Screening  Completed    Hepatitis C Screening  Completed    COVID-19 Vaccine  Completed    Pneumococcal Vaccine: Pediatrics (0 to 5 Years) and At-Risk Patients (6 to 59 Years)  Aged Out    HIB Vaccine  Aged Luis Wisdom Hepatitis B Vaccine  Aged Out    IPV Vaccine  Aged Out    Hepatitis A Vaccine  Aged Out    Meningococcal ACWY Vaccine  Aged Out    HPV Vaccine  Aged Out     Immunization History   Administered Date(s) Administered    Meningococcal Polysaccharide (MPSV4) 07/17/2002    SARS-CoV-2 / COVID-19 mRNA IM (HAUL) 12/21/2020, 01/11/2021    Td (adult), adsorbed 11/09/1998    Tdap 15/03/4987       Fabiana Arenas MD

## 2021-09-27 ENCOUNTER — TELEPHONE (OUTPATIENT)
Dept: PAIN MEDICINE | Facility: CLINIC | Age: 36
End: 2021-09-27

## 2021-09-27 NOTE — TELEPHONE ENCOUNTER
Pt is calling in stating that he is in a lot of pain  Pain level 7/10    Pt would like to know if [facility]Q can order something for him    Pt can be reached at [phone] hard copy, drawn during this pregnancy

## 2021-09-27 NOTE — TELEPHONE ENCOUNTER
Pt reports that on Friday morning he was putting stuff into his truck when he began with pain of his LB, does travel towards the right and left    Intermittent shocks, spasms and tightness  Pt denies any leg pain  Pain is 7/20  Pt said no new symptoms  He said its only been a couple months since his last inj, they don't seem to be lasting as long as they used to  Pt said he has done steroid pack in the past  Pt not sure what next step should be? Last inj were 6/15/21 Lt L4-5 TFESI and 1/26/21 B/L L4 TFESI  Pls advise

## 2021-09-27 NOTE — TELEPHONE ENCOUNTER
Would like to see him back in the office for re-evaluation    Can double book him on a Monday or Wednesday with me

## 2021-09-29 ENCOUNTER — OFFICE VISIT (OUTPATIENT)
Dept: PAIN MEDICINE | Facility: CLINIC | Age: 36
End: 2021-09-29
Payer: COMMERCIAL

## 2021-09-29 VITALS
DIASTOLIC BLOOD PRESSURE: 70 MMHG | BODY MASS INDEX: 27.02 KG/M2 | SYSTOLIC BLOOD PRESSURE: 113 MMHG | HEART RATE: 73 BPM | WEIGHT: 183 LBS

## 2021-09-29 DIAGNOSIS — M47.816 LUMBAR SPONDYLOSIS: ICD-10-CM

## 2021-09-29 DIAGNOSIS — M51.16 INTERVERTEBRAL DISC DISORDER WITH RADICULOPATHY OF LUMBAR REGION: Primary | ICD-10-CM

## 2021-09-29 PROCEDURE — 99214 OFFICE O/P EST MOD 30 MIN: CPT | Performed by: ANESTHESIOLOGY

## 2021-09-29 PROCEDURE — 1036F TOBACCO NON-USER: CPT | Performed by: ANESTHESIOLOGY

## 2021-09-29 RX ORDER — DICLOFENAC SODIUM 75 MG/1
75 TABLET, DELAYED RELEASE ORAL 2 TIMES DAILY
Qty: 60 TABLET | Refills: 2 | Status: SHIPPED | OUTPATIENT
Start: 2021-09-29

## 2021-09-29 NOTE — PROGRESS NOTES
Assessment:  1  Intervertebral disc disorder with radiculopathy of lumbar region    2  Lumbar spondylosis      Plan:  The patient is experiencing an exacerbation of pain in the lower back since Friday with weakness of the left leg  At this time, I will order an updated MRI of the lumbar spine to evaluate  Advised him I will call with the results and discuss treatment moving forward  For now, will start him on diclofenac 75 mg twice daily with meals to help with inflammation and pain  For long-term relief I did discuss that he has significant pain over the facet and he may be a candidate for medial branch blocks in anticipation of radiofrequency ablation  Complete risks and benefits including bleeding, infection, tissue reaction, nerve injury and allergic reaction were discussed  The approach was demonstrated using models and literature was provided  Verbal and written consent was obtained  My impressions and treatment recommendations were discussed in detail with the patient who verbalized understanding and had no further questions  Discharge instructions were provided  I personally saw and examined the patient and I agree with the above discussed plan of care  Orders Placed This Encounter   Procedures    MRI lumbar spine without contrast     Standing Status:   Future     Standing Expiration Date:   9/29/2025     Scheduling Instructions: There is no preparation for this test  Please leave your jewelry and valuables at home, wedding rings are the exception  Magnetic nail polish must be removed prior to arrival for your test  Please bring your insurance cards, a form of photo ID and a list of your medications with you  Arrive 15 minutes prior to your appointment time in order to register  Please bring any prior CT or MRI studies of this area that were not performed at a [facility] facility  To schedule this appointment, please contact Central Scheduling at [phone]  Prior to your appointment, please make sure you complete the MRI Screening Form when you e-Check in for your appointment  This will be available starting 7 days before your appointment in [address]  You may receive an e-mail with an activation code if you do not have a [employer] account  If you do not have access to a device, we will complete your screening at your appointment  Order Specific Question:   What is the patient's sedation requirement? Answer:   No Sedation     Order Specific Question:   Release to patient through [employer]     Answer:   Immediate     Order Specific Question:   Is order priority selected as STAT? Answer:   No     Order Specific Question:   Reason for Exam (FREE TEXT)     Answer:   worsening left low back pain into hip     New Medications Ordered This Visit   Medications    diclofenac (VOLTAREN) 75 mg EC tablet     Sig: Take 1 tablet (75 mg total) by mouth 2 (two) times a day     Dispense:  60 tablet     Refill:  2       History of Present Illness:  Yecenia Chandler  is a 38 y o  male who presents for a follow up office visit in regards to Back Pain  The patient has a history of lumbar disc disorder with radiculopathy and lumbar spondylosis who returns for follow-up  He reports an exacerbation of pain since last Friday in which he hyperextended his back while placing his golf clubs in his truck  He reports immediate shooting pain in his lower back into the left more than the right hip with some weakness of the left leg  He has been taking ibuprofen with mild relief  But still is experiencing moderate to severe pain in the lower back rated 8/10 on a numeric rating scale  I have personally reviewed and/or updated the patient's past medical history, past surgical history, family history, social history, current medications, allergies, and vital signs today  Review of Systems   Respiratory: Negative for shortness of breath  Cardiovascular: Negative for chest pain  Gastrointestinal: Negative for constipation, diarrhea, nausea and vomiting  Musculoskeletal: Positive for back pain  Negative for arthralgias, gait problem, joint swelling and myalgias  Skin: Negative for rash  Neurological: Positive for weakness  Negative for dizziness and seizures  All other systems reviewed and are negative        Patient Active Problem List   Diagnosis    Chronic low back pain    Disc degeneration, lumbar    Lumbar disc herniation    Periodic health assessment, general screening, adult    Alopecia    Intervertebral disc disorder with radiculopathy of lumbar region    Patellofemoral dysfunction of left knee    Internal derangement of left knee    Anxiety    Other headache syndrome       Past Medical History:   Diagnosis Date    Asthma     Esophageal reflux        Past Surgical History:   Procedure Laterality Date    TOOTH EXTRACTION         Family History   Problem Relation Age of Onset    No Known Problems Mother     Hyperlipidemia Father     Hypertension Father     Cancer Maternal Grandfather     Hypertension Paternal Grandmother     Hypertension Paternal Grandfather        Social History     Occupational History    Not on file   Tobacco Use    Smoking status: Never Smoker    Smokeless tobacco: Never Used   Vaping Use    Vaping Use: Never used   Substance and Sexual Activity    Alcohol use: Yes     Comment: social    Drug use: No    Sexual activity: Yes       Current Outpatient Medications on File Prior to Visit   Medication Sig    albuterol (PROVENTIL HFA,VENTOLIN HFA) 90 mcg/act inhaler Inhale 2 puffs every 4 (four) hours as needed (as needed)    cetirizine (ZYRTEC ALLERGY) 10 mg tablet Take 10 mg by mouth daily    finasteride (PROPECIA) 1 MG tablet Take 1 tablet (1 mg total) by mouth daily    multivitamin (THERAGRAN) TABS Take 1 tablet by mouth daily    ranitidine (ZANTAC) 150 mg tablet Take 150 mg by mouth 2 (two) times a day    [DISCONTINUED] naproxen (NAPROSYN) 500 mg tablet Take 1 tablet by mouth as needed     No current facility-administered medications on file prior to visit  No Known Allergies    Physical Exam:    /70   Pulse 73   Wt 83 kg (183 lb)   BMI 27 02 kg/m²     Constitutional:normal, well developed, well nourished, alert, in no distress and non-toxic and no overt pain behavior  Eyes:anicteric  HEENT:grossly intact  Neck:supple, symmetric, trachea midline and no masses   Pulmonary:even and unlabored  Cardiovascular:No edema or pitting edema present  Skin:Normal without rashes or lesions and well hydrated  Psychiatric:Mood and affect appropriate  Neurologic:Cranial Nerves II-XII grossly intact  Musculoskeletal:normal     Lumbar Spine Exam  Appearance:  Normal lordosis  Palpation/Tenderness:  left lumbar paraspinal tenderness  right lumbar paraspinal tenderness  left sacroiliac joint tenderness  Left-sided lumbar facet tenderness at L4-5, L5-S1 with positive facet loading  Range of Motion:  Flexion: Moderately limited  with pain  Extension:  Severely limited  with pain  Motor Strength:  Left hip flexion:  4/5  Left hip extension:  5/5  Right hip flexion:  5/5  Right hip extension:  5/5  Left knee flexion:  5/5  Left knee extension:  4/5  Right knee flexion:  5/5  Right knee extension:  5/5  Left foot dorsiflexion:  5/5  Left foot plantar flexion:  5/5  Right foot dorsiflexion:  5/5  Right foot plantar flexion:  5/5  Reflexes:  Left Patellar:  2+   Right Patellar:  2+   Left Achilles:  2+   Right Achilles:  2+     Imaging    MRI LUMBAR SPINE WITHOUT CONTRAST (3/15/2018)     INDICATION:  Low back pain radiating into both legs     COMPARISON:  MR 4/21/2008     TECHNIQUE:  Sagittal T1, sagittal T2, sagittal inversion recovery, axial T1 and axial T2, coronal T2        IMAGE QUALITY:  Diagnostic     FINDINGS:     ALIGNMENT:  Normal alignment of the lumbar spine  No compression fracture  No spondylolysis or spondylolisthesis    No scoliosis      MARROW SIGNAL:  Normal marrow signal is identified within the visualized bony structures  No discrete marrow lesion      DISTAL CORD AND CONUS:  Normal size and signal within the distal cord and conus  The conus ends at the T12-L1 level      PARASPINAL SOFT TISSUES:  Paraspinal soft tissues are unremarkable      SACRUM:  Normal signal within the sacrum  No evidence of insufficiency or stress fracture  Partial sacralization of left L5 transverse process      LOWER THORACIC DISC SPACES:  Normal disc height and signal   No disc herniation, canal stenosis or foraminal narrowing      LUMBAR DISC SPACES:     L1-L2:  Normal      L2-L3:  Small left foraminal protrusion, new since prior study  Disc is in contact with left L2 root      L3-L4:  Moderate very mild acet arthrosis, increasing right foraminal protrusion, correlate for right L3 radiculitis      L4-L5:  Broad-based protrusion extending to the left, overtly stable in appearance  Minor narrowing of the left lateral recess, no evidence for L5 root compression     L5-S1:  Partial sacralization left transverse process  Minor facet arthrosis

## 2021-10-06 ENCOUNTER — APPOINTMENT (OUTPATIENT)
Dept: LAB | Facility: AMBULARY SURGERY CENTER | Age: 36
End: 2021-10-06
Payer: COMMERCIAL

## 2021-10-06 DIAGNOSIS — Z00.00 PERIODIC HEALTH ASSESSMENT, GENERAL SCREENING, ADULT: ICD-10-CM

## 2021-10-06 DIAGNOSIS — L65.9 ALOPECIA: ICD-10-CM

## 2021-10-06 DIAGNOSIS — R35.1 NOCTURIA: ICD-10-CM

## 2021-10-06 LAB
ALBUMIN SERPL BCP-MCNC: 3.8 G/DL (ref 3.5–5)
ALP SERPL-CCNC: 73 U/L (ref 46–116)
ALT SERPL W P-5'-P-CCNC: 89 U/L (ref 12–78)
ANION GAP SERPL CALCULATED.3IONS-SCNC: 3 MMOL/L (ref 4–13)
AST SERPL W P-5'-P-CCNC: 38 U/L (ref 5–45)
BILIRUB SERPL-MCNC: 1.03 MG/DL (ref 0.2–1)
BUN SERPL-MCNC: 19 MG/DL (ref 5–25)
CALCIUM SERPL-MCNC: 8.9 MG/DL (ref 8.3–10.1)
CHLORIDE SERPL-SCNC: 107 MMOL/L (ref 100–108)
CHOLEST SERPL-MCNC: 150 MG/DL (ref 50–200)
CO2 SERPL-SCNC: 29 MMOL/L (ref 21–32)
CREAT SERPL-MCNC: 1.3 MG/DL (ref 0.6–1.3)
GFR SERPL CREATININE-BSD FRML MDRD: 70 ML/MIN/1.73SQ M
GLUCOSE P FAST SERPL-MCNC: 89 MG/DL (ref 65–99)
HDLC SERPL-MCNC: 43 MG/DL
LDLC SERPL CALC-MCNC: 91 MG/DL (ref 0–100)
NONHDLC SERPL-MCNC: 107 MG/DL
POTASSIUM SERPL-SCNC: 4.4 MMOL/L (ref 3.5–5.3)
PROT SERPL-MCNC: 7 G/DL (ref 6.4–8.2)
PSA SERPL-MCNC: 0.3 NG/ML (ref 0–4)
SODIUM SERPL-SCNC: 139 MMOL/L (ref 136–145)
TRIGL SERPL-MCNC: 81 MG/DL
TSH SERPL DL<=0.05 MIU/L-ACNC: 1.59 UIU/ML (ref 0.36–3.74)

## 2021-10-06 PROCEDURE — 84443 ASSAY THYROID STIM HORMONE: CPT

## 2021-10-06 PROCEDURE — 80053 COMPREHEN METABOLIC PANEL: CPT

## 2021-10-06 PROCEDURE — G0103 PSA SCREENING: HCPCS

## 2021-10-06 PROCEDURE — 36415 COLL VENOUS BLD VENIPUNCTURE: CPT

## 2021-10-06 PROCEDURE — 80061 LIPID PANEL: CPT

## 2021-10-12 ENCOUNTER — IMMUNIZATIONS (OUTPATIENT)
Dept: FAMILY MEDICINE CLINIC | Facility: HOSPITAL | Age: 36
End: 2021-10-12

## 2021-10-12 DIAGNOSIS — Z23 ENCOUNTER FOR IMMUNIZATION: Primary | ICD-10-CM

## 2021-10-12 PROCEDURE — 0001A SARS-COV-2 / COVID-19 MRNA VACCINE (PFIZER-BIONTECH) 30 MCG: CPT

## 2021-10-12 PROCEDURE — 91300 SARS-COV-2 / COVID-19 MRNA VACCINE (PFIZER-BIONTECH) 30 MCG: CPT

## 2021-10-15 DIAGNOSIS — L65.9 ALOPECIA: ICD-10-CM

## 2021-10-15 RX ORDER — FINASTERIDE 1 MG/1
TABLET, FILM COATED ORAL
Qty: 90 TABLET | Refills: 0 | Status: SHIPPED | OUTPATIENT
Start: 2021-10-15 | End: 2022-02-10 | Stop reason: ALTCHOICE

## 2021-10-16 ENCOUNTER — HOSPITAL ENCOUNTER (OUTPATIENT)
Dept: RADIOLOGY | Facility: HOSPITAL | Age: 36
Discharge: HOME | End: 2021-10-16
Attending: ANESTHESIOLOGY
Payer: COMMERCIAL

## 2021-10-16 DIAGNOSIS — M51.16 INTERVERTEBRAL DISC DISORDER WITH RADICULOPATHY OF LUMBAR REGION: ICD-10-CM

## 2021-10-16 PROCEDURE — G1004 CDSM NDSC: HCPCS

## 2021-10-16 PROCEDURE — 72148 MRI LUMBAR SPINE W/O DYE: CPT

## 2021-11-11 ENCOUNTER — TELEPHONE (OUTPATIENT)
Dept: RADIOLOGY | Facility: CLINIC | Age: 36
End: 2021-11-11

## 2021-11-11 DIAGNOSIS — M47.816 LUMBAR SPONDYLOSIS: Primary | ICD-10-CM

## 2021-12-03 ENCOUNTER — HOSPITAL ENCOUNTER (OUTPATIENT)
Dept: RADIOLOGY | Facility: CLINIC | Age: 36
Discharge: HOME | End: 2021-12-03
Attending: ANESTHESIOLOGY | Admitting: ANESTHESIOLOGY
Payer: COMMERCIAL

## 2021-12-03 VITALS
OXYGEN SATURATION: 98 % | DIASTOLIC BLOOD PRESSURE: 81 MMHG | RESPIRATION RATE: 20 BRPM | HEART RATE: 74 BPM | SYSTOLIC BLOOD PRESSURE: 116 MMHG | TEMPERATURE: 98.6 F

## 2021-12-03 DIAGNOSIS — M47.816 LUMBAR SPONDYLOSIS: ICD-10-CM

## 2021-12-03 PROCEDURE — 64493 INJ PARAVERT F JNT L/S 1 LEV: CPT | Performed by: ANESTHESIOLOGY

## 2021-12-03 PROCEDURE — 64494 INJ PARAVERT F JNT L/S 2 LEV: CPT | Performed by: ANESTHESIOLOGY

## 2021-12-03 RX ORDER — BUPIVACAINE HCL/PF 2.5 MG/ML
10 VIAL (ML) INJECTION ONCE
Status: COMPLETED | OUTPATIENT
Start: 2021-12-03 | End: 2021-12-03

## 2021-12-03 RX ADMIN — BUPIVACAINE HYDROCHLORIDE 3 ML: 2.5 INJECTION, SOLUTION EPIDURAL; INFILTRATION; INTRACAUDAL at 14:24

## 2021-12-07 ENCOUNTER — TELEPHONE (OUTPATIENT)
Dept: RADIOLOGY | Facility: CLINIC | Age: 36
End: 2021-12-07

## 2022-02-10 ENCOUNTER — HOSPITAL ENCOUNTER (OUTPATIENT)
Dept: RADIOLOGY | Facility: CLINIC | Age: 37
Discharge: HOME | End: 2022-02-10
Attending: ANESTHESIOLOGY | Admitting: ANESTHESIOLOGY
Payer: COMMERCIAL

## 2022-02-10 VITALS
TEMPERATURE: 98 F | SYSTOLIC BLOOD PRESSURE: 106 MMHG | OXYGEN SATURATION: 98 % | RESPIRATION RATE: 20 BRPM | DIASTOLIC BLOOD PRESSURE: 71 MMHG | HEART RATE: 75 BPM

## 2022-02-10 DIAGNOSIS — M47.816 LUMBAR SPONDYLOSIS: ICD-10-CM

## 2022-02-10 PROCEDURE — 64494 INJ PARAVERT F JNT L/S 2 LEV: CPT | Performed by: ANESTHESIOLOGY

## 2022-02-10 PROCEDURE — 64493 INJ PARAVERT F JNT L/S 1 LEV: CPT | Performed by: ANESTHESIOLOGY

## 2022-02-10 RX ORDER — BUPIVACAINE HYDROCHLORIDE 7.5 MG/ML
5 INJECTION, SOLUTION EPIDURAL; RETROBULBAR ONCE
Status: COMPLETED | OUTPATIENT
Start: 2022-02-10 | End: 2022-02-10

## 2022-02-10 RX ORDER — BUPIVACAINE HCL/PF 2.5 MG/ML
10 VIAL (ML) INJECTION ONCE
Status: DISCONTINUED | OUTPATIENT
Start: 2022-02-10 | End: 2022-02-10

## 2022-02-10 RX ADMIN — BUPIVACAINE HYDROCHLORIDE 3 ML: 7.5 INJECTION, SOLUTION EPIDURAL; RETROBULBAR at 14:04

## 2022-02-10 NOTE — H&P
History of Present Illness: The patient is a 43 y o  male who presents with complaints of lower back pain is here today for confirmatory medial branch blocks  Patient Active Problem List   Diagnosis    Chronic low back pain    Disc degeneration, lumbar    Lumbar disc herniation    Periodic health assessment, general screening, adult    Alopecia    Intervertebral disc disorder with radiculopathy of lumbar region    Patellofemoral dysfunction of left knee    Internal derangement of left knee    Anxiety    Other headache syndrome    Lumbar spondylosis       Past Medical History:   Diagnosis Date    Asthma     Esophageal reflux        Past Surgical History:   Procedure Laterality Date    TOOTH EXTRACTION           Current Outpatient Medications:     albuterol (PROVENTIL HFA,VENTOLIN HFA) 90 mcg/act inhaler, Inhale 2 puffs every 4 (four) hours as needed (as needed), Disp: 1 Inhaler, Rfl: 3    cetirizine (ZYRTEC ALLERGY) 10 mg tablet, Take 10 mg by mouth daily, Disp: , Rfl:     diclofenac (VOLTAREN) 75 mg EC tablet, Take 1 tablet (75 mg total) by mouth 2 (two) times a day, Disp: 60 tablet, Rfl: 2    multivitamin (THERAGRAN) TABS, Take 1 tablet by mouth daily, Disp: , Rfl:     ranitidine (ZANTAC) 150 mg tablet, Take 150 mg by mouth 2 (two) times a day, Disp: , Rfl:     Current Facility-Administered Medications:     bupivacaine (PF) (MARCAINE) 0 25 % injection 10 mL, 10 mL, Perineural, Once, Jennifer Salazar MD    No Known Allergies    Physical Exam:   Vitals:    02/10/22 1336   BP: 119/73   Pulse: 78   Resp: 20   Temp: 98 °F (36 7 °C)   SpO2: 97%     General: Awake, Alert, Oriented x 3, Mood and affect appropriate  Respiratory: Respirations even and unlabored  Cardiovascular: Peripheral pulses intact; no edema  Musculoskeletal Exam:  Lower back pain    ASA Score: 2    Patient/Chart Verification  Patient ID Verified: Verbal  Consents Confirmed:  To be obtained in the Pre-Procedure area  Interval H&P(within 24 hr) Complete (required for Outpatients and Surgery Admit only): To be obtained in the Pre-Procedure area  Allergies Reviewed: Yes  Anticoag/NSAID held?: NA  Currently on antibiotics?: No    Assessment:   1   Lumbar spondylosis        Plan: B/L L3-L5 MBB#2

## 2022-02-10 NOTE — DISCHARGE INSTR - LAB

## 2022-04-05 ENCOUNTER — TELEPHONE (OUTPATIENT)
Dept: RADIOLOGY | Facility: CLINIC | Age: 37
End: 2022-04-05

## 2022-04-05 ENCOUNTER — HOSPITAL ENCOUNTER (OUTPATIENT)
Dept: RADIOLOGY | Facility: CLINIC | Age: 37
Discharge: HOME | End: 2022-04-05
Attending: ANESTHESIOLOGY | Admitting: ANESTHESIOLOGY
Payer: COMMERCIAL

## 2022-04-05 VITALS
TEMPERATURE: 97.4 F | HEART RATE: 83 BPM | SYSTOLIC BLOOD PRESSURE: 111 MMHG | RESPIRATION RATE: 20 BRPM | OXYGEN SATURATION: 99 % | DIASTOLIC BLOOD PRESSURE: 65 MMHG

## 2022-04-05 DIAGNOSIS — M47.816 LUMBAR SPONDYLOSIS: ICD-10-CM

## 2022-04-05 PROCEDURE — 64635 DESTROY LUMB/SAC FACET JNT: CPT | Performed by: ANESTHESIOLOGY

## 2022-04-05 PROCEDURE — 64636 DESTROY L/S FACET JNT ADDL: CPT | Performed by: ANESTHESIOLOGY

## 2022-04-05 RX ORDER — BUPIVACAINE HCL/PF 2.5 MG/ML
10 VIAL (ML) INJECTION ONCE
Status: COMPLETED | OUTPATIENT
Start: 2022-04-05 | End: 2022-04-05

## 2022-04-05 RX ORDER — METHYLPREDNISOLONE ACETATE 80 MG/ML
80 INJECTION, SUSPENSION INTRA-ARTICULAR; INTRALESIONAL; INTRAMUSCULAR; PARENTERAL; SOFT TISSUE ONCE
Status: COMPLETED | OUTPATIENT
Start: 2022-04-05 | End: 2022-04-05

## 2022-04-05 RX ORDER — LIDOCAINE HYDROCHLORIDE 10 MG/ML
30 INJECTION, SOLUTION EPIDURAL; INFILTRATION; INTRACAUDAL; PERINEURAL ONCE
Status: COMPLETED | OUTPATIENT
Start: 2022-04-05 | End: 2022-04-05

## 2022-04-05 RX ADMIN — LIDOCAINE HYDROCHLORIDE 16 ML: 10 INJECTION, SOLUTION EPIDURAL; INFILTRATION; INTRACAUDAL; PERINEURAL at 13:16

## 2022-04-05 RX ADMIN — METHYLPREDNISOLONE ACETATE 20 MG: 80 INJECTION, SUSPENSION INTRA-ARTICULAR; INTRALESIONAL; INTRAMUSCULAR; PARENTERAL; SOFT TISSUE at 13:27

## 2022-04-05 RX ADMIN — BUPIVACAINE HYDROCHLORIDE 3 ML: 2.5 INJECTION, SOLUTION EPIDURAL; INFILTRATION; INTRACAUDAL at 13:27

## 2022-04-05 NOTE — DISCHARGE INSTR - LAB
Medial Branch Radiofrequency Ablation     WHAT YOU NEED TO KNOW:   Medial branch radiofrequency ablation (RFA) is a procedure used to treat facet joint pain in your neck, mid back, or lower back  Facet joints are found at the back of each vertebra  A needle electrode is used to send electrical currents to the nerves in your facet joint  The electrical currents create heat that damages the nerve so it cannot send pain signals  ACTIVITY  Do not drive or operate machinery today  No strenuous activity today - bending, lifting, etc   You may shower today, but do not sit in a tub of water  Resume normal activities tomorrow as tolerated  CARE OF THE INJECTION SITE  If you have soreness or pain, apply ice to the area today (20 minutes on/20 minutes off)  Starting tomorrow, you may use warm, moist heat or ice if needed  Notify the [facility] if you have any of the following: redness, drainage, swelling, or fever above 100°F     SPECIAL INSTRUCTIONS  Our office will call you tomorrow for a progress report and make an appointment for a follow up visit in 4 weeks  If you feel a sunburn-like sensation in the area of your procedure, call our office  MEDICATIONS  Continue to take all routine medications  Our office may have instructed you to hold some medications  As no general anesthesia was used in today's procedure, you should not experience any side effects related to anesthesia  If you have a problem specifically related to your procedure, please call our office at [phone]  Problems not related to your procedure should be directed to your primary care physician

## 2022-04-05 NOTE — H&P
History of Present Illness:  The patient is a 43 y o  male who presents with complaints of left low back pain and is here today for lumbar ablation    Patient Active Problem List   Diagnosis    Chronic low back pain    Disc degeneration, lumbar    Lumbar disc herniation    Periodic health assessment, general screening, adult    Alopecia    Intervertebral disc disorder with radiculopathy of lumbar region    Patellofemoral dysfunction of left knee    Internal derangement of left knee    Anxiety    Other headache syndrome    Lumbar spondylosis       Past Medical History:   Diagnosis Date    Asthma     Esophageal reflux        Past Surgical History:   Procedure Laterality Date    TOOTH EXTRACTION           Current Outpatient Medications:     albuterol (PROVENTIL HFA,VENTOLIN HFA) 90 mcg/act inhaler, Inhale 2 puffs every 4 (four) hours as needed (as needed), Disp: 1 Inhaler, Rfl: 3    cetirizine (ZYRTEC ALLERGY) 10 mg tablet, Take 10 mg by mouth daily, Disp: , Rfl:     diclofenac (VOLTAREN) 75 mg EC tablet, Take 1 tablet (75 mg total) by mouth 2 (two) times a day, Disp: 60 tablet, Rfl: 2    multivitamin (THERAGRAN) TABS, Take 1 tablet by mouth daily, Disp: , Rfl:     ranitidine (ZANTAC) 150 mg tablet, Take 150 mg by mouth 2 (two) times a day, Disp: , Rfl:     Current Facility-Administered Medications:     bupivacaine (PF) (MARCAINE) 0 25 % injection 10 mL, 10 mL, Perineural, Once, Zaira Duarte MD    lidocaine (PF) (XYLOCAINE-MPF) 1 % injection 30 mL, 30 mL, Infiltration, Once, Zaira Duarte MD    methylPREDNISolone acetate (DEPO-MEDROL) injection 80 mg, 80 mg, Perineural, Once, Zaira Duarte MD    No Known Allergies    Physical Exam:   Vitals:    04/05/22 1304   BP: 102/65   Pulse: 80   Resp: 20   Temp: (!) 97 4 °F (36 3 °C)   SpO2: 99%     General: Awake, Alert, Oriented x 3, Mood and affect appropriate  Respiratory: Respirations even and unlabored  Cardiovascular: Peripheral pulses intact; no edema  Musculoskeletal Exam: low back pain    ASA Score: 1    Patient/Chart Verification  Patient ID Verified: Verbal  Consents Confirmed: To be obtained in the Pre-Procedure area  Interval H&P(within 24 hr) Complete (required for Outpatients and Surgery Admit only): To be obtained in the Pre-Procedure area  Allergies Reviewed: Yes  Anticoag/NSAID held?: NA  Currently on antibiotics?: No    Assessment:   1   Lumbar spondylosis        Plan: Lt L3-L5 RFA

## 2022-04-06 NOTE — TELEPHONE ENCOUNTER
Pt said he had very little soreness last night, it didn't require pain meds or any ice  Inj sites look fine as wife cleaned them with alcohol  Pt told it takes 6 wks to see full effect from ablation  I confirmed the Rt sided RFA on 4/19/22

## 2022-04-19 ENCOUNTER — TELEPHONE (OUTPATIENT)
Dept: RADIOLOGY | Facility: CLINIC | Age: 37
End: 2022-04-19

## 2022-04-19 ENCOUNTER — HOSPITAL ENCOUNTER (OUTPATIENT)
Dept: RADIOLOGY | Facility: CLINIC | Age: 37
Discharge: HOME | End: 2022-04-19
Attending: ANESTHESIOLOGY | Admitting: ANESTHESIOLOGY
Payer: COMMERCIAL

## 2022-04-19 VITALS
DIASTOLIC BLOOD PRESSURE: 95 MMHG | OXYGEN SATURATION: 98 % | RESPIRATION RATE: 20 BRPM | SYSTOLIC BLOOD PRESSURE: 108 MMHG | HEART RATE: 79 BPM | TEMPERATURE: 97.1 F

## 2022-04-19 DIAGNOSIS — M47.816 LUMBAR SPONDYLOSIS: ICD-10-CM

## 2022-04-19 PROCEDURE — 64636 DESTROY L/S FACET JNT ADDL: CPT | Performed by: ANESTHESIOLOGY

## 2022-04-19 PROCEDURE — 64635 DESTROY LUMB/SAC FACET JNT: CPT | Performed by: ANESTHESIOLOGY

## 2022-04-19 RX ORDER — BUPIVACAINE HCL/PF 2.5 MG/ML
10 VIAL (ML) INJECTION ONCE
Status: COMPLETED | OUTPATIENT
Start: 2022-04-19 | End: 2022-04-19

## 2022-04-19 RX ORDER — LIDOCAINE HYDROCHLORIDE 10 MG/ML
30 INJECTION, SOLUTION EPIDURAL; INFILTRATION; INTRACAUDAL; PERINEURAL ONCE
Status: COMPLETED | OUTPATIENT
Start: 2022-04-19 | End: 2022-04-19

## 2022-04-19 RX ORDER — METHYLPREDNISOLONE ACETATE 80 MG/ML
80 INJECTION, SUSPENSION INTRA-ARTICULAR; INTRALESIONAL; INTRAMUSCULAR; PARENTERAL; SOFT TISSUE ONCE
Status: COMPLETED | OUTPATIENT
Start: 2022-04-19 | End: 2022-04-19

## 2022-04-19 RX ADMIN — LIDOCAINE HYDROCHLORIDE 18 ML: 10 INJECTION, SOLUTION EPIDURAL; INFILTRATION; INTRACAUDAL; PERINEURAL at 13:15

## 2022-04-19 RX ADMIN — BUPIVACAINE HYDROCHLORIDE 3 ML: 2.5 INJECTION, SOLUTION EPIDURAL; INFILTRATION; INTRACAUDAL at 13:28

## 2022-04-19 RX ADMIN — METHYLPREDNISOLONE ACETATE 20 MG: 80 INJECTION, SUSPENSION INTRA-ARTICULAR; INTRALESIONAL; INTRAMUSCULAR; PARENTERAL; SOFT TISSUE at 13:28

## 2022-04-19 NOTE — DISCHARGE INSTR - LAB
Medial Branch Radiofrequency Ablation     WHAT YOU NEED TO KNOW:   Medial branch radiofrequency ablation (RFA) is a procedure used to treat facet joint pain in your neck, mid back, or lower back  Facet joints are found at the back of each vertebra  A needle electrode is used to send electrical currents to the nerves in your facet joint  The electrical currents create heat that damages the nerve so it cannot send pain signals  ACTIVITY  Do not drive or operate machinery today  No strenuous activity today - bending, lifting, etc   You may shower today, but do not sit in a tub of water  Resume normal activities tomorrow as tolerated  CARE OF THE INJECTION SITE  If you have soreness or pain, apply ice to the area today (20 minutes on/20 minutes off)  Starting tomorrow, you may use warm, moist heat or ice if needed  Notify the [facility] if you have any of the following: redness, drainage, swelling, or fever above 100°F     SPECIAL INSTRUCTIONS  Our office will call you tomorrow for a progress report and make an appointment for a follow up visit in 4 weeks  If you feel a sunburn-like sensation in the area of your procedure, call our office  MEDICATIONS  Continue to take all routine medications  Our office may have instructed you to hold some medications  As no general anesthesia was used in today's procedure, you should not experience any side effects related to anesthesia  If you have a problem specifically related to your procedure, please call our office at [phone]  Problems not related to your procedure should be directed to your primary care physician

## 2022-04-19 NOTE — H&P
History of Present Illness:  The patient is a 43 y o  male who presents with complaints of right lower back pain and is here today for right L3-5 ablation    Patient Active Problem List   Diagnosis    Chronic low back pain    Disc degeneration, lumbar    Lumbar disc herniation    Periodic health assessment, general screening, adult    Alopecia    Intervertebral disc disorder with radiculopathy of lumbar region    Patellofemoral dysfunction of left knee    Internal derangement of left knee    Anxiety    Other headache syndrome    Lumbar spondylosis       Past Medical History:   Diagnosis Date    Asthma     Esophageal reflux        Past Surgical History:   Procedure Laterality Date    TOOTH EXTRACTION           Current Outpatient Medications:     albuterol (PROVENTIL HFA,VENTOLIN HFA) 90 mcg/act inhaler, Inhale 2 puffs every 4 (four) hours as needed (as needed), Disp: 1 Inhaler, Rfl: 3    cetirizine (ZYRTEC ALLERGY) 10 mg tablet, Take 10 mg by mouth daily, Disp: , Rfl:     diclofenac (VOLTAREN) 75 mg EC tablet, Take 1 tablet (75 mg total) by mouth 2 (two) times a day, Disp: 60 tablet, Rfl: 2    multivitamin (THERAGRAN) TABS, Take 1 tablet by mouth daily, Disp: , Rfl:     ranitidine (ZANTAC) 150 mg tablet, Take 150 mg by mouth 2 (two) times a day, Disp: , Rfl:     Current Facility-Administered Medications:     bupivacaine (PF) (MARCAINE) 0 25 % injection 10 mL, 10 mL, Perineural, Once, Cris Hendrickson MD    lidocaine (PF) (XYLOCAINE-MPF) 1 % injection 30 mL, 30 mL, Infiltration, Once, Cris Hendrickson MD    methylPREDNISolone acetate (DEPO-MEDROL) injection 80 mg, 80 mg, Perineural, Once, Cris Hendrickson MD    No Known Allergies    Physical Exam:   Vitals:    04/19/22 1307   BP: 106/67   Pulse: 80   Resp: 20   Temp: (!) 97 1 °F (36 2 °C)   SpO2: 98%     General: Awake, Alert, Oriented x 3, Mood and affect appropriate  Respiratory: Respirations even and unlabored  Cardiovascular: Peripheral pulses intact; no edema  Musculoskeletal Exam:  Right lower back pain    ASA Score: 1    Patient/Chart Verification  Patient ID Verified: Verbal  Consents Confirmed: To be obtained in the Pre-Procedure area  Interval H&P(within 24 hr) Complete (required for Outpatients and Surgery Admit only): To be obtained in the Pre-Procedure area  Allergies Reviewed: Yes  Anticoag/NSAID held?: NA  Currently on antibiotics?: No    Assessment:   1   Lumbar spondylosis        Plan: Rt L3-L5 RFA

## 2022-04-19 NOTE — TELEPHONE ENCOUNTER
Call pt 4/20 s/p Rt lumbar RFA  (going on vac Wed 4/20, may leave a vm )  Pt has ov w/ FQ for 6/1/22

## 2022-04-20 NOTE — TELEPHONE ENCOUNTER
Pt reports a little soreness as compared to the last RFA, but tolerable  He has not needed to take any medication for it  His needle sites looked fine this AM    Pt has 6 wk f/u post RFA w/ FQ for 6/1/22

## 2022-04-26 ENCOUNTER — HOSPITAL ENCOUNTER (OUTPATIENT)
Dept: CT IMAGING | Facility: HOSPITAL | Age: 37
Discharge: HOME | End: 2022-04-26
Payer: COMMERCIAL

## 2022-04-26 DIAGNOSIS — R51.9 CHRONIC NONINTRACTABLE HEADACHE, UNSPECIFIED HEADACHE TYPE: ICD-10-CM

## 2022-04-26 DIAGNOSIS — G89.29 CHRONIC NONINTRACTABLE HEADACHE, UNSPECIFIED HEADACHE TYPE: ICD-10-CM

## 2022-04-26 PROCEDURE — 70450 CT HEAD/BRAIN W/O DYE: CPT

## 2022-06-01 ENCOUNTER — TELEPHONE (OUTPATIENT)
Dept: PAIN MEDICINE | Facility: CLINIC | Age: 37
End: 2022-06-01

## 2022-07-01 ENCOUNTER — OFFICE VISIT (OUTPATIENT)
Dept: FAMILY MEDICINE CLINIC | Facility: CLINIC | Age: 37
End: 2022-07-01
Payer: COMMERCIAL

## 2022-07-01 VITALS
HEIGHT: 70 IN | RESPIRATION RATE: 18 BRPM | HEART RATE: 85 BPM | TEMPERATURE: 98.2 F | SYSTOLIC BLOOD PRESSURE: 110 MMHG | DIASTOLIC BLOOD PRESSURE: 84 MMHG | OXYGEN SATURATION: 95 % | BODY MASS INDEX: 26.7 KG/M2 | WEIGHT: 186.5 LBS

## 2022-07-01 DIAGNOSIS — L73.9 FOLLICULITIS: Primary | ICD-10-CM

## 2022-07-01 PROCEDURE — 3725F SCREEN DEPRESSION PERFORMED: CPT | Performed by: FAMILY MEDICINE

## 2022-07-01 PROCEDURE — 99213 OFFICE O/P EST LOW 20 MIN: CPT | Performed by: FAMILY MEDICINE

## 2022-07-01 RX ORDER — CEPHALEXIN 500 MG/1
500 CAPSULE ORAL 3 TIMES DAILY
Qty: 30 CAPSULE | Refills: 0 | Status: SHIPPED | OUTPATIENT
Start: 2022-07-01 | End: 2022-07-11

## 2022-07-01 RX ORDER — PREDNISONE 20 MG/1
TABLET ORAL
Qty: 10 TABLET | Refills: 0 | Status: SHIPPED | OUTPATIENT
Start: 2022-07-01 | End: 2022-09-30 | Stop reason: ALTCHOICE

## 2022-07-01 NOTE — PROGRESS NOTES
FAMILY PRACTICE OFFICE VISIT       NAME: Luis Majano  AGE: 43 y o  SEX: male       : 1985        MRN: [id number]    DATE: 7/3/2022  TIME: 7:13 AM    Assessment and Plan     Problem List Items Addressed This Visit        Musculoskeletal and Integument    Folliculitis - Primary     Folliculitis  Patient with suspected folliculitis after wax and treatment  He was given prescription for cephalexin 500 mg to take t i d  for 10 days  Patient will keep the area clean and dry  Patient will call if symptoms persist after medication completed           Relevant Medications    cephalexin (KEFLEX) 500 mg capsule    predniSONE 20 mg tablet              Chief Complaint     Chief Complaint   Patient presents with    Rash     On back , x 2 weeks  painful and itchy        History of Present Illness     Patient states he had stopped taking his Propecia recently  He developed increase hair growth on his back area  He subsequently went for treatment of waxing  After waxing treatment patient began developing pruritic papules on his entire back  At this time he denies any fevers but does have discomfort and itching  Review of Systems   Review of Systems   Constitutional: Negative  Skin: Positive for rash         Active Problem List     Patient Active Problem List   Diagnosis    Chronic low back pain    Disc degeneration, lumbar    Lumbar disc herniation    Periodic health assessment, general screening, adult    Alopecia    Intervertebral disc disorder with radiculopathy of lumbar region    Patellofemoral dysfunction of left knee    Internal derangement of left knee    Anxiety    Other headache syndrome    Lumbar spondylosis    Folliculitis       Past Medical History:  Past Medical History:   Diagnosis Date    Asthma     Esophageal reflux        Past Surgical History:  Past Surgical History:   Procedure Laterality Date    TOOTH EXTRACTION         Family History:  Family History   Problem Relation Age of Onset    No Known Problems Mother     Hyperlipidemia Father     Hypertension Father     Cancer Maternal Grandfather     Hypertension Paternal Grandmother     Hypertension Paternal Grandfather        Social History:  Social History     Socioeconomic History    Marital status: /Civil Union     Spouse name: Not on file    Number of children: Not on file    Years of education: Not on file    Highest education level: Not on file   Occupational History    Not on file   Tobacco Use    Smoking status: Never Smoker    Smokeless tobacco: Never Used   Vaping Use    Vaping Use: Never used   Substance and Sexual Activity    Alcohol use: Yes     Comment: social    Drug use: No    Sexual activity: Yes   Other Topics Concern    Not on file   Social History Narrative    Not on file     Social Determinants of Health     Financial Resource Strain: Not on file   Food Insecurity: Not on file   Transportation Needs: Not on file   Physical Activity: Not on file   Stress: Not on file   Social Connections: Not on file   Intimate Partner Violence: Not on file   Housing Stability: Not on file       Objective     Vitals:    07/01/22 1431   BP: 110/84   Pulse: 85   Resp: 18   Temp: 98 2 °F (36 8 °C)   SpO2: 95%     Wt Readings from Last 3 Encounters:   07/01/22 84 6 kg (186 lb 8 oz)   09/29/21 83 kg (183 lb)   09/16/21 83 2 kg (183 lb 6 4 oz)       Physical Exam  Constitutional:       General: He is not in acute distress  Appearance: Normal appearance  He is not ill-appearing  Skin:     Findings: Rash present  Comments: Patient's entire back had scattered red pruritic papules  There were no discharge from papules  Lesions were approximately 1/4 cm diameter   Neurological:      Mental Status: He is alert           Pertinent Laboratory/Diagnostic Studies:  Lab Results   Component Value Date    GLUCOSE 87 07/01/2015    BUN 19 10/06/2021    CREATININE 1 30 10/06/2021    CALCIUM 8 9 10/06/2021  07/01/2015    K 4 4 10/06/2021    CO2 29 10/06/2021     10/06/2021     Lab Results   Component Value Date    ALT 89 (H) 10/06/2021    AST 38 10/06/2021    ALKPHOS 73 10/06/2021    BILITOT 0 63 07/01/2015       Lab Results   Component Value Date    WBC 7 43 08/21/2020    HGB 16 0 08/21/2020    HCT 45 4 08/21/2020    MCV 91 08/21/2020     08/21/2020       No results found for: TSH    Lab Results   Component Value Date    CHOL 120 07/01/2015     Lab Results   Component Value Date    TRIG 81 10/06/2021     Lab Results   Component Value Date    HDL 43 10/06/2021     Lab Results   Component Value Date    LDLCALC 91 10/06/2021     No results found for: HGBA1C    Results for orders placed or performed in visit on 10/06/21   Comprehensive metabolic panel   Result Value Ref Range    Sodium 139 136 - 145 mmol/L    Potassium 4 4 3 5 - 5 3 mmol/L    Chloride 107 100 - 108 mmol/L    CO2 29 21 - 32 mmol/L    ANION GAP 3 (L) 4 - 13 mmol/L    BUN 19 5 - 25 mg/dL    Creatinine 1 30 0 60 - 1 30 mg/dL    Glucose, Fasting 89 65 - 99 mg/dL    Calcium 8 9 8 3 - 10 1 mg/dL    AST 38 5 - 45 U/L    ALT 89 (H) 12 - 78 U/L    Alkaline Phosphatase 73 46 - 116 U/L    Total Protein 7 0 6 4 - 8 2 g/dL    Albumin 3 8 3 5 - 5 0 g/dL    Total Bilirubin 1 03 (H) 0 20 - 1 00 mg/dL    eGFR 70 ml/min/1 73sq m   Lipid panel   Result Value Ref Range    Cholesterol 150 50 - 200 mg/dL    Triglycerides 81 <=150 mg/dL    HDL, Direct 43 >=40 mg/dL    LDL Calculated 91 0 - 100 mg/dL    Non-HDL-Chol (CHOL-HDL) 107 mg/dl   TSH, 3rd generation   Result Value Ref Range    TSH 3RD GENERATON 1 590 0 358 - 3 740 uIU/mL   PSA, Total Screen   Result Value Ref Range    PSA 0 3 0 0 - 4 0 ng/mL       No orders of the defined types were placed in this encounter        ALLERGIES:  No Known Allergies    Current Medications     Current Outpatient Medications   Medication Sig Dispense Refill    albuterol (PROVENTIL HFA,VENTOLIN HFA) 90 mcg/act inhaler Inhale 2 puffs every 4 (four) hours as needed (as needed) 1 Inhaler 3    cephalexin (KEFLEX) 500 mg capsule Take 1 capsule (500 mg total) by mouth 3 (three) times a day for 10 days 30 capsule 0    cetirizine (ZyrTEC) 10 mg tablet Take 10 mg by mouth daily      multivitamin (THERAGRAN) TABS Take 1 tablet by mouth daily      predniSONE 20 mg tablet 2 tabs po q day with food 10 tablet 0    ranitidine (ZANTAC) 150 mg tablet Take 150 mg by mouth 2 (two) times a day      diclofenac (VOLTAREN) 75 mg EC tablet Take 1 tablet (75 mg total) by mouth 2 (two) times a day (Patient not taking: Reported on 7/1/2022) 60 tablet 2     No current facility-administered medications for this visit           Health Maintenance     Health Maintenance   Topic Date Due    PT PLAN OF CARE  07/25/2019    Influenza Vaccine (1) 09/01/2022    BMI: Followup Plan  09/16/2022    Annual Physical  09/16/2022    Depression Screening  07/01/2023    BMI: Adult  07/01/2023    DTaP,Tdap,and Td Vaccines (3 - Td or Tdap) 02/10/2031    HIV Screening  Completed    Hepatitis C Screening  Completed    COVID-19 Vaccine  Completed    Pneumococcal Vaccine: Pediatrics (0 to 5 Years) and At-Risk Patients (6 to 59 Years)  Aged Out    HIB Vaccine  Aged Out    Hepatitis B Vaccine  Aged Out    IPV Vaccine  Aged Out    Hepatitis A Vaccine  Aged Out    Meningococcal ACWY Vaccine  Aged Out    HPV Vaccine  Aged [employer] History   Administered Date(s) Administered    COVID-19 PFIZER VACCINE 0 3 ML IM 12/21/2020, 01/11/2021, 10/12/2021    INFLUENZA 11/19/2021    Meningococcal Polysaccharide (MPSV4) 07/17/2002    Td (adult), adsorbed 11/09/1998    Tdap 25/88/0777       Gretchen Sherman MD

## 2022-07-03 NOTE — ASSESSMENT & PLAN NOTE
Folliculitis  Patient with suspected folliculitis after wax and treatment  He was given prescription for cephalexin 500 mg to take t i d  for 10 days  Patient will keep the area clean and dry    Patient will call if symptoms persist after medication completed

## 2022-09-30 ENCOUNTER — OFFICE VISIT (OUTPATIENT)
Dept: FAMILY MEDICINE CLINIC | Facility: CLINIC | Age: 37
End: 2022-09-30
Payer: COMMERCIAL

## 2022-09-30 VITALS
SYSTOLIC BLOOD PRESSURE: 102 MMHG | HEART RATE: 78 BPM | OXYGEN SATURATION: 99 % | RESPIRATION RATE: 16 BRPM | TEMPERATURE: 98 F | DIASTOLIC BLOOD PRESSURE: 60 MMHG | BODY MASS INDEX: 26.48 KG/M2 | HEIGHT: 70 IN | WEIGHT: 185 LBS

## 2022-09-30 DIAGNOSIS — Z23 NEED FOR INFLUENZA VACCINATION: Primary | ICD-10-CM

## 2022-09-30 DIAGNOSIS — Z00.00 WELL ADULT EXAM: ICD-10-CM

## 2022-09-30 DIAGNOSIS — Z00.00 PERIODIC HEALTH ASSESSMENT, GENERAL SCREENING, ADULT: ICD-10-CM

## 2022-09-30 DIAGNOSIS — L65.9 ALOPECIA: ICD-10-CM

## 2022-09-30 DIAGNOSIS — R51.9 CHRONIC NONINTRACTABLE HEADACHE, UNSPECIFIED HEADACHE TYPE: ICD-10-CM

## 2022-09-30 DIAGNOSIS — G89.29 CHRONIC NONINTRACTABLE HEADACHE, UNSPECIFIED HEADACHE TYPE: ICD-10-CM

## 2022-09-30 PROCEDURE — 99395 PREV VISIT EST AGE 18-39: CPT | Performed by: FAMILY MEDICINE

## 2022-09-30 NOTE — ASSESSMENT & PLAN NOTE
Well adult  Overall the patient appears to be in stable health  He will obtain blood work as ordered for further evaluation  He states he will receive annual flu vaccine at his place of employment

## 2022-09-30 NOTE — PROGRESS NOTES
FAMILY PRACTICE OFFICE VISIT       NAME: Yazan Head  AGE: 43 y o  SEX: male       : 1985        MRN: [id number]    DATE: 2022  TIME: 10:28 AM    Assessment and Plan     Problem List Items Addressed This Visit        Musculoskeletal and Integument    Alopecia    Relevant Orders    CBC    Comprehensive metabolic panel    Lipid panel    TSH, 3rd generation       Other    Periodic health assessment, general screening, adult    Relevant Orders    CBC    Comprehensive metabolic panel    Lipid panel    TSH, 3rd generation    Well adult exam     Well adult  Overall the patient appears to be in stable health  He will obtain blood work as ordered for further evaluation  He states he will receive annual flu vaccine at his place of employment  Need for influenza vaccination - Primary    Relevant Orders    influenza vaccine, quadrivalent, 0 5 mL, preservative-free, for adult and pediatric patients 6 mos+ (AFLURIA, FLUARIX, FLULAVAL, FLUZONE)      Other Visit Diagnoses     Chronic nonintractable headache, unspecified headache type        Relevant Orders    CBC    Comprehensive metabolic panel    Lipid panel    TSH, 3rd generation              Chief Complaint     Chief Complaint   Patient presents with    Physical Exam     Yearly       History of Present Illness     Patient in the office for annual wellness exam   He denies any recent illness however a few months ago he had a mild case of suspected COVID-19  His wife tested positive at the same time however the patient did not feel the need to do any testing  Symptoms resolved spontaneously  He still sees his pain management physician occasionally for exacerbations of his back pain  He is able to stay active with mild exercise  Patient did discontinue his Propecia since he was experiencing headaches and fatigue which have since improved since discontinuing medication  Review of Systems   Review of Systems   Constitutional: Negative  HENT: Negative  Eyes: Negative  Respiratory: Negative  Cardiovascular: Negative  Gastrointestinal: Negative  Genitourinary: Negative  Musculoskeletal: Positive for arthralgias and back pain  Skin: Negative  Neurological: Negative  Psychiatric/Behavioral: Negative          Active Problem List     Patient Active Problem List   Diagnosis    Chronic low back pain    Disc degeneration, lumbar    Lumbar disc herniation    Periodic health assessment, general screening, adult    Alopecia    Intervertebral disc disorder with radiculopathy of lumbar region    Patellofemoral dysfunction of left knee    Internal derangement of left knee    Anxiety    Other headache syndrome    Lumbar spondylosis    Folliculitis    Well adult exam    Need for influenza vaccination       Past Medical History:  Past Medical History:   Diagnosis Date    Asthma     Esophageal reflux        Past Surgical History:  Past Surgical History:   Procedure Laterality Date    TOOTH EXTRACTION         Family History:  Family History   Problem Relation Age of Onset    No Known Problems Mother     Hyperlipidemia Father     Hypertension Father     Cancer Maternal Grandfather     Hypertension Paternal Grandmother     Hypertension Paternal Grandfather        Social History:  Social History     Socioeconomic History    Marital status: /Civil Union     Spouse name: Not on file    Number of children: Not on file    Years of education: Not on file    Highest education level: Not on file   Occupational History    Not on file   Tobacco Use    Smoking status: Never Smoker    Smokeless tobacco: Never Used   Vaping Use    Vaping Use: Never used   Substance and Sexual Activity    Alcohol use: Yes     Comment: social    Drug use: No    Sexual activity: Yes   Other Topics Concern    Not on file   Social History Narrative    Not on file     Social Determinants of Health     Financial Resource Strain: Not on file   Food Insecurity: Not on file   Transportation Needs: Not on file   Physical Activity: Not on file   Stress: Not on file   Social Connections: Not on file   Intimate Partner Violence: Not on file   Housing Stability: Not on file       Objective     Vitals:    09/30/22 1003   BP: 102/60   Pulse: 78   Resp: 16   Temp: 98 °F (36 7 °C)   SpO2: 99%     Wt Readings from Last 3 Encounters:   09/30/22 83 9 kg (185 lb)   07/01/22 84 6 kg (186 lb 8 oz)   09/29/21 83 kg (183 lb)       Physical Exam  Constitutional:       General: He is not in acute distress  Appearance: Normal appearance  He is not ill-appearing  HENT:      Head: Normocephalic and atraumatic  Eyes:      General:         Right eye: No discharge  Left eye: No discharge  Extraocular Movements: Extraocular movements intact  Conjunctiva/sclera: Conjunctivae normal       Pupils: Pupils are equal, round, and reactive to light  Neck:      Vascular: No carotid bruit  Cardiovascular:      Rate and Rhythm: Normal rate and regular rhythm  Heart sounds: Normal heart sounds  No murmur heard  Pulmonary:      Effort: Pulmonary effort is normal       Breath sounds: Normal breath sounds  No wheezing, rhonchi or rales  Abdominal:      General: Abdomen is flat  Bowel sounds are normal  There is no distension  Palpations: Abdomen is soft  Tenderness: There is no abdominal tenderness  There is no guarding or rebound  Musculoskeletal:      Right lower leg: No edema  Left lower leg: No edema  Lymphadenopathy:      Cervical: No cervical adenopathy  Skin:     Findings: No rash  Neurological:      General: No focal deficit present  Mental Status: He is alert and oriented to person, place, and time  Cranial Nerves: No cranial nerve deficit  Psychiatric:         Mood and Affect: Mood normal          Behavior: Behavior normal          Thought Content:  Thought content normal          Judgment: Judgment normal  Pertinent Laboratory/Diagnostic Studies:  Lab Results   Component Value Date    GLUCOSE 87 07/01/2015    BUN 19 10/06/2021    CREATININE 1 30 10/06/2021    CALCIUM 8 9 10/06/2021     07/01/2015    K 4 4 10/06/2021    CO2 29 10/06/2021     10/06/2021     Lab Results   Component Value Date    ALT 89 (H) 10/06/2021    AST 38 10/06/2021    ALKPHOS 73 10/06/2021    BILITOT 0 63 07/01/2015       Lab Results   Component Value Date    WBC 7 43 08/21/2020    HGB 16 0 08/21/2020    HCT 45 4 08/21/2020    MCV 91 08/21/2020     08/21/2020       No results found for: TSH    Lab Results   Component Value Date    CHOL 120 07/01/2015     Lab Results   Component Value Date    TRIG 81 10/06/2021     Lab Results   Component Value Date    HDL 43 10/06/2021     Lab Results   Component Value Date    LDLCALC 91 10/06/2021     No results found for: HGBA1C    Results for orders placed or performed in visit on 10/06/21   Comprehensive metabolic panel   Result Value Ref Range    Sodium 139 136 - 145 mmol/L    Potassium 4 4 3 5 - 5 3 mmol/L    Chloride 107 100 - 108 mmol/L    CO2 29 21 - 32 mmol/L    ANION GAP 3 (L) 4 - 13 mmol/L    BUN 19 5 - 25 mg/dL    Creatinine 1 30 0 60 - 1 30 mg/dL    Glucose, Fasting 89 65 - 99 mg/dL    Calcium 8 9 8 3 - 10 1 mg/dL    AST 38 5 - 45 U/L    ALT 89 (H) 12 - 78 U/L    Alkaline Phosphatase 73 46 - 116 U/L    Total Protein 7 0 6 4 - 8 2 g/dL    Albumin 3 8 3 5 - 5 0 g/dL    Total Bilirubin 1 03 (H) 0 20 - 1 00 mg/dL    eGFR 70 ml/min/1 73sq m   Lipid panel   Result Value Ref Range    Cholesterol 150 50 - 200 mg/dL    Triglycerides 81 <=150 mg/dL    HDL, Direct 43 >=40 mg/dL    LDL Calculated 91 0 - 100 mg/dL    Non-HDL-Chol (CHOL-HDL) 107 mg/dl   TSH, 3rd generation   Result Value Ref Range    TSH 3RD GENERATON 1 590 0 358 - 3 740 uIU/mL   PSA, Total Screen   Result Value Ref Range    PSA 0 3 0 0 - 4 0 ng/mL       Orders Placed This Encounter   Procedures    influenza vaccine, quadrivalent, 0 5 mL, preservative-free, for adult and pediatric patients 6 mos+ (AFLURIA, FLUARIX, FLULAVAL, FLUZONE)    CBC    Comprehensive metabolic panel    Lipid panel    TSH, 3rd generation       ALLERGIES:  No Known Allergies    Current Medications     Current Outpatient Medications   Medication Sig Dispense Refill    albuterol (PROVENTIL HFA,VENTOLIN HFA) 90 mcg/act inhaler Inhale 2 puffs every 4 (four) hours as needed (as needed) 1 Inhaler 3    cetirizine (ZyrTEC) 10 mg tablet Take 10 mg by mouth daily      multivitamin (THERAGRAN) TABS Take 1 tablet by mouth daily      ranitidine (ZANTAC) 150 mg tablet Take 150 mg by mouth 2 (two) times a day       No current facility-administered medications for this visit           Health Maintenance     Health Maintenance   Topic Date Due    PT PLAN OF CARE  07/25/2019    BMI: Followup Plan  09/16/2022    Annual Physical  09/16/2022    Influenza Vaccine (1) 06/30/2023 (Originally 9/1/2022)    Depression Screening  07/01/2023    BMI: Adult  07/01/2023    DTaP,Tdap,and Td Vaccines (3 - Td or Tdap) 02/10/2031    HIV Screening  Completed    Hepatitis C Screening  Completed    COVID-19 Vaccine  Completed    Pneumococcal Vaccine: Pediatrics (0 to 5 Years) and At-Risk Patients (6 to 59 Years)  Aged Out    HIB Vaccine  Aged Out    Hepatitis B Vaccine  Aged Out    IPV Vaccine  Aged Out    Hepatitis A Vaccine  Aged Out    Meningococcal ACWY Vaccine  Aged Out    HPV Vaccine  Aged [employer] History   Administered Date(s) Administered    COVID-19 PFIZER VACCINE 0 3 ML IM 12/21/2020, 01/11/2021, 10/12/2021    INFLUENZA 11/19/2021    Meningococcal Polysaccharide (MPSV4) 07/17/2002    Td (adult), adsorbed 11/09/1998    Tdap 15/80/2441       Gretchen Hams

## 2022-11-29 PROBLEM — Z00.00 WELL ADULT EXAM: Status: RESOLVED | Noted: 2022-09-30 | Resolved: 2022-11-29

## 2023-01-12 DIAGNOSIS — R53.83 OTHER FATIGUE: Primary | ICD-10-CM

## 2023-01-16 ENCOUNTER — APPOINTMENT (OUTPATIENT)
Dept: LAB | Facility: CLINIC | Age: 38
End: 2023-01-16

## 2023-01-16 DIAGNOSIS — R53.83 OTHER FATIGUE: ICD-10-CM

## 2023-01-16 DIAGNOSIS — L65.9 ALOPECIA: ICD-10-CM

## 2023-01-16 DIAGNOSIS — Z00.00 PERIODIC HEALTH ASSESSMENT, GENERAL SCREENING, ADULT: ICD-10-CM

## 2023-01-16 DIAGNOSIS — G89.29 CHRONIC NONINTRACTABLE HEADACHE, UNSPECIFIED HEADACHE TYPE: ICD-10-CM

## 2023-01-16 DIAGNOSIS — R51.9 CHRONIC NONINTRACTABLE HEADACHE, UNSPECIFIED HEADACHE TYPE: ICD-10-CM

## 2023-01-16 LAB
ALBUMIN SERPL BCP-MCNC: 4.2 G/DL (ref 3.5–5)
ALP SERPL-CCNC: 72 U/L (ref 34–104)
ALT SERPL W P-5'-P-CCNC: 26 U/L (ref 7–52)
ANION GAP SERPL CALCULATED.3IONS-SCNC: 6 MMOL/L (ref 4–13)
AST SERPL W P-5'-P-CCNC: 18 U/L (ref 13–39)
BILIRUB SERPL-MCNC: 0.46 MG/DL (ref 0.2–1)
BUN SERPL-MCNC: 20 MG/DL (ref 5–25)
CALCIUM SERPL-MCNC: 8.9 MG/DL (ref 8.4–10.2)
CHLORIDE SERPL-SCNC: 106 MMOL/L (ref 96–108)
CHOLEST SERPL-MCNC: 153 MG/DL
CO2 SERPL-SCNC: 29 MMOL/L (ref 21–32)
CREAT SERPL-MCNC: 1.11 MG/DL (ref 0.6–1.3)
ERYTHROCYTE [DISTWIDTH] IN BLOOD BY AUTOMATED COUNT: 12.7 % (ref 11.6–15.1)
GFR SERPL CREATININE-BSD FRML MDRD: 83 ML/MIN/1.73SQ M
GLUCOSE P FAST SERPL-MCNC: 92 MG/DL (ref 65–99)
HCT VFR BLD AUTO: 41.6 % (ref 36.5–49.3)
HDLC SERPL-MCNC: 39 MG/DL
HGB BLD-MCNC: 14.9 G/DL (ref 12–17)
LDLC SERPL CALC-MCNC: 79 MG/DL (ref 0–100)
MCH RBC QN AUTO: 31.4 PG (ref 26.8–34.3)
MCHC RBC AUTO-ENTMCNC: 35.8 G/DL (ref 31.4–37.4)
MCV RBC AUTO: 88 FL (ref 82–98)
NONHDLC SERPL-MCNC: 114 MG/DL
PLATELET # BLD AUTO: 183 THOUSANDS/UL (ref 149–390)
PMV BLD AUTO: 9.2 FL (ref 8.9–12.7)
POTASSIUM SERPL-SCNC: 3.9 MMOL/L (ref 3.5–5.3)
PROT SERPL-MCNC: 6.9 G/DL (ref 6.4–8.4)
RBC # BLD AUTO: 4.74 MILLION/UL (ref 3.88–5.62)
SODIUM SERPL-SCNC: 141 MMOL/L (ref 135–147)
TRIGL SERPL-MCNC: 176 MG/DL
TSH SERPL DL<=0.05 MIU/L-ACNC: 2.26 UIU/ML (ref 0.45–4.5)
WBC # BLD AUTO: 6.8 THOUSAND/UL (ref 4.31–10.16)

## 2023-01-18 LAB
TESTOST FREE SERPL-MCNC: 17.6 PG/ML (ref 8.7–25.1)
TESTOST SERPL-MCNC: 513 NG/DL (ref 264–916)

## 2023-03-08 ENCOUNTER — CONSULT (OUTPATIENT)
Dept: GASTROENTEROLOGY | Facility: AMBULARY SURGERY CENTER | Age: 38
End: 2023-03-08

## 2023-03-08 ENCOUNTER — TELEPHONE (OUTPATIENT)
Dept: GASTROENTEROLOGY | Facility: AMBULARY SURGERY CENTER | Age: 38
End: 2023-03-08

## 2023-03-08 VITALS
BODY MASS INDEX: 27.37 KG/M2 | HEART RATE: 77 BPM | WEIGHT: 191.2 LBS | DIASTOLIC BLOOD PRESSURE: 74 MMHG | OXYGEN SATURATION: 99 % | HEIGHT: 70 IN | SYSTOLIC BLOOD PRESSURE: 110 MMHG

## 2023-03-08 DIAGNOSIS — K21.9 GASTROESOPHAGEAL REFLUX DISEASE WITHOUT ESOPHAGITIS: Primary | ICD-10-CM

## 2023-03-08 DIAGNOSIS — R19.7 DIARRHEA, UNSPECIFIED TYPE: ICD-10-CM

## 2023-03-08 RX ORDER — PANTOPRAZOLE SODIUM 40 MG/1
40 TABLET, DELAYED RELEASE ORAL DAILY
Qty: 90 TABLET | Refills: 3 | Status: SHIPPED | OUTPATIENT
Start: 2023-03-08

## 2023-03-08 NOTE — PROGRESS NOTES
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, history of loose stools  1   Chronic GERD:  -Recommended pantoprazole 40 mg in the morning  -Famotidine in the evening  -Dietary modification  -We will proceed with an upper endoscopy to exclude any significant esophagitis or Cabral's    2  Chronic loose stools: Frequent history of loose stools for many years  -Most likely baseline function for the patient  -Recommend proceeding with a colonoscopy the same time, we will biopsy for celiac and microscopic colitis  -Discussed with him risks of procedures including bleeding, surgery, perforation, missed polyp detection rate    Ina Escudero was seen today for gerd and screening colonoscopy  Diagnoses and all orders for this visit:    Gastroesophageal reflux disease without esophagitis  -     pantoprazole (PROTONIX) 40 mg tablet; Take 1 tablet (40 mg total) by mouth daily  -     EGD; Future    Diarrhea, unspecified type  -     Colonoscopy; Future    Other orders  -     Diet NPO; Sips with meds; Standing  -     Void on call to OR; Standing            _____________________________________________________________        CC: Chronic GERD, loose stools    HPI:  Jovi Tripp  is a 42 y o male who was referred for evaluation of GERD and loose stools  Pleasant and healthy 38-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, longstanding history of reflux, had been on PPI therapy for several years and weaned himself off of it to famotidine 10 mg twice daily but has frequent breakthrough symptoms  Denies any nausea, vomiting, dysphagia  Has frequent bowel movements have is about 5 or 6 bowel movements per day which are soft to loose which is baseline for the patient  Denies any significant urgency, melena, rectal eating abdominal pain      Patient is otherwise healthy other than asthma and chronic back issues  Surgical history is notable for nerve ablation  Denies any tobacco, drinks about 4 drinks per week  He is a CRNA  Family history is notable for maternal aunt with colon cancer in her 62s  Mother had polyps  ROS:  The remainder of the ROS was negative except for the pertinent positives mentioned in HPI  Allergies: Patient has no known allergies  Medications:   Current Outpatient Medications:   •  albuterol (PROVENTIL HFA,VENTOLIN HFA) 90 mcg/act inhaler, Inhale 2 puffs every 4 (four) hours as needed (as needed), Disp: 1 Inhaler, Rfl: 3  •  cetirizine (ZyrTEC) 10 mg tablet, Take 10 mg by mouth daily, Disp: , Rfl:   •  multivitamin (THERAGRAN) TABS, Take 1 tablet by mouth daily, Disp: , Rfl:   •  pantoprazole (PROTONIX) 40 mg tablet, Take 1 tablet (40 mg total) by mouth daily, Disp: 90 tablet, Rfl: 3  •  ranitidine (ZANTAC) 150 mg tablet, Take 150 mg by mouth 2 (two) times a day, Disp: , Rfl: '    Past Medical History:   Diagnosis Date   • Asthma    • Esophageal reflux        Past Surgical History:   Procedure Laterality Date   • TOOTH EXTRACTION         Family History   Problem Relation Age of Onset   • No Known Problems Mother    • Hyperlipidemia Father    • Hypertension Father    • Cancer Maternal Grandfather    • Hypertension Paternal Grandmother    • Hypertension Paternal Grandfather         reports that he has never smoked  He has never used smokeless tobacco  He reports current alcohol use  He reports that he does not use drugs            Physical Exam:     /74 (BP Location: Right arm, Patient Position: Sitting, Cuff Size: Standard)   Pulse 77   Ht 5' 10" (1 778 m)   Wt 86 7 kg (191 lb 3 2 oz)   SpO2 99%   BMI 27 43 kg/m²     Gen: wn/wd, NAD, healthy-appearing gentleman  HEENT: anicteric, MMM, no cervical LAD  CVS: RRR, no m/r/g  CHEST: CTA b/l  ABD: +BS, soft, NT,ND, no hepatosplenomegaly  EXT: no c/c/e  NEURO: aaox3  SKIN: NO rashes

## 2023-03-08 NOTE — LETTER
March 8, 4836     Misha MalagonAkiko almanza [address] [place] [address]    Patient: Jenifer Del Valle  YOB: 1985   Date of Visit: 3/8/2023       Dear Dr Lynnette Jama: Thank you for referring Fozia Bell to me for evaluation  Below are my notes for this consultation  If you have questions, please do not hesitate to call me  I look forward to following your patient along with you  Sincerely,        Kory Trejo MD        CC: No Recipients  Kory Trejo MD  3/8/2023  4:58 PM  Sign when Signing Visit  Consultation - [facility]  Jenifer Del Valle  42 y o  male MRN: [id number]          Assessment & Plan:  Pleasant 64-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, 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, history of loose stools  1   Chronic GERD:  -Recommended pantoprazole 40 mg in the morning  -Famotidine in the evening  -Dietary modification  -We will proceed with an upper endoscopy to exclude any significant esophagitis or Cabral's    2  Chronic loose stools: Frequent history of loose stools for many years  -Most likely baseline function for the patient  -Recommend proceeding with a colonoscopy the same time, we will biopsy for celiac and microscopic colitis  -Discussed with him risks of procedures including bleeding, surgery, perforation, missed polyp detection rate    Lora Peabody was seen today for gerd and screening colonoscopy  Diagnoses and all orders for this visit:    Gastroesophageal reflux disease without esophagitis  -     pantoprazole (PROTONIX) 40 mg tablet; Take 1 tablet (40 mg total) by mouth daily  -     EGD; Future    Diarrhea, unspecified type  -     Colonoscopy;  Future    Other orders  -     Diet NPO; Sips with meds; Standing  -     Void on call to OR; Standing            _____________________________________________________________        CC: Chronic GERD, loose stools    HPI:  Jenifer Del Valle  is a 42 y o male who was referred for evaluation of GERD and loose stools  Pleasant and healthy 70-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, longstanding history of reflux, had been on PPI therapy for several years and weaned himself off of it to famotidine 10 mg twice daily but has frequent breakthrough symptoms  Denies any nausea, vomiting, dysphagia  Has frequent bowel movements have is about 5 or 6 bowel movements per day which are soft to loose which is baseline for the patient  Denies any significant urgency, melena, rectal eating abdominal pain  Patient is otherwise healthy other than asthma and chronic back issues  Surgical history is notable for nerve ablation  Denies any tobacco, drinks about 4 drinks per week  He is a CRNA  Family history is notable for maternal aunt with colon cancer in her 62s  Mother had polyps  ROS:  The remainder of the ROS was negative except for the pertinent positives mentioned in HPI  Allergies: Patient has no known allergies  Medications:   Current Outpatient Medications:   •  albuterol (PROVENTIL HFA,VENTOLIN HFA) 90 mcg/act inhaler, Inhale 2 puffs every 4 (four) hours as needed (as needed), Disp: 1 Inhaler, Rfl: 3  •  cetirizine (ZyrTEC) 10 mg tablet, Take 10 mg by mouth daily, Disp: , Rfl:   •  multivitamin (THERAGRAN) TABS, Take 1 tablet by mouth daily, Disp: , Rfl:   •  pantoprazole (PROTONIX) 40 mg tablet, Take 1 tablet (40 mg total) by mouth daily, Disp: 90 tablet, Rfl: 3  •  ranitidine (ZANTAC) 150 mg tablet, Take 150 mg by mouth 2 (two) times a day, Disp: , Rfl: '    Past Medical History:   Diagnosis Date   • Asthma    • Esophageal reflux        Past Surgical History:   Procedure Laterality Date   • TOOTH EXTRACTION         Family History   Problem Relation Age of Onset   • No Known Problems Mother    • Hyperlipidemia Father    • Hypertension Father    • Cancer Maternal Grandfather    • Hypertension Paternal Grandmother    • Hypertension Paternal Grandfather         reports that he has never smoked   He has never used smokeless tobacco  He reports current alcohol use  He reports that he does not use drugs            Physical Exam:     /74 (BP Location: Right arm, Patient Position: Sitting, Cuff Size: Standard)   Pulse 77   Ht 5' 10" (1 778 m)   Wt 86 7 kg (191 lb 3 2 oz)   SpO2 99%   BMI 27 43 kg/m²     Gen: wn/wd, NAD, healthy-appearing gentleman  HEENT: anicteric, MMM, no cervical LAD  CVS: RRR, no m/r/g  CHEST: CTA b/l  ABD: +BS, soft, NT,ND, no hepatosplenomegaly  EXT: no c/c/e  NEURO: aaox3  SKIN: NO rashes

## 2023-03-08 NOTE — PATIENT INSTRUCTIONS
Scheduled date of EGD/colonoscopy (as of today):  05/31/23  Physician performing EGD/colonoscopy: dr Arreola Nicholas  Location of EGD/colonoscopy:  [facility]  Desired bowel prep reviewed with patient: miralax  dulcolax  Instructions reviewed with patient by: ho harkins  Clearances:   n a

## 2023-05-31 ENCOUNTER — ANESTHESIA EVENT (OUTPATIENT)
Dept: GASTROENTEROLOGY | Facility: AMBULARY SURGERY CENTER | Age: 38
End: 2023-05-31

## 2023-05-31 ENCOUNTER — HOSPITAL ENCOUNTER (OUTPATIENT)
Dept: GASTROENTEROLOGY | Facility: AMBULARY SURGERY CENTER | Age: 38
Setting detail: OUTPATIENT SURGERY
Discharge: HOME | End: 2023-05-31
Attending: INTERNAL MEDICINE
Payer: COMMERCIAL

## 2023-05-31 ENCOUNTER — ANESTHESIA (OUTPATIENT)
Dept: GASTROENTEROLOGY | Facility: AMBULARY SURGERY CENTER | Age: 38
End: 2023-05-31

## 2023-05-31 VITALS
RESPIRATION RATE: 22 BRPM | TEMPERATURE: 97.2 F | BODY MASS INDEX: 26.2 KG/M2 | OXYGEN SATURATION: 100 % | SYSTOLIC BLOOD PRESSURE: 100 MMHG | HEART RATE: 73 BPM | WEIGHT: 183 LBS | DIASTOLIC BLOOD PRESSURE: 53 MMHG | HEIGHT: 70 IN

## 2023-05-31 DIAGNOSIS — R19.7 DIARRHEA, UNSPECIFIED TYPE: ICD-10-CM

## 2023-05-31 DIAGNOSIS — K21.9 GASTROESOPHAGEAL REFLUX DISEASE WITHOUT ESOPHAGITIS: ICD-10-CM

## 2023-05-31 PROCEDURE — 88305 TISSUE EXAM BY PATHOLOGIST: CPT | Performed by: PATHOLOGY

## 2023-05-31 RX ORDER — PROPOFOL 10 MG/ML
INJECTION, EMULSION INTRAVENOUS AS NEEDED
Status: DISCONTINUED | OUTPATIENT
Start: 2023-05-31 | End: 2023-05-31

## 2023-05-31 RX ORDER — PROPOFOL 10 MG/ML
INJECTION, EMULSION INTRAVENOUS CONTINUOUS PRN
Status: DISCONTINUED | OUTPATIENT
Start: 2023-05-31 | End: 2023-05-31

## 2023-05-31 RX ORDER — SODIUM CHLORIDE, SODIUM LACTATE, POTASSIUM CHLORIDE, CALCIUM CHLORIDE 600; 310; 30; 20 MG/100ML; MG/100ML; MG/100ML; MG/100ML
INJECTION, SOLUTION INTRAVENOUS CONTINUOUS PRN
Status: DISCONTINUED | OUTPATIENT
Start: 2023-05-31 | End: 2023-05-31

## 2023-05-31 RX ORDER — LIDOCAINE HYDROCHLORIDE 10 MG/ML
INJECTION, SOLUTION EPIDURAL; INFILTRATION; INTRACAUDAL; PERINEURAL AS NEEDED
Status: DISCONTINUED | OUTPATIENT
Start: 2023-05-31 | End: 2023-05-31

## 2023-05-31 RX ADMIN — PROPOFOL 30 MG: 10 INJECTION, EMULSION INTRAVENOUS at 07:43

## 2023-05-31 RX ADMIN — PROPOFOL 60 MG: 10 INJECTION, EMULSION INTRAVENOUS at 07:34

## 2023-05-31 RX ADMIN — PROPOFOL 120 MCG/KG/MIN: 10 INJECTION, EMULSION INTRAVENOUS at 07:43

## 2023-05-31 RX ADMIN — PROPOFOL 30 MG: 10 INJECTION, EMULSION INTRAVENOUS at 07:37

## 2023-05-31 RX ADMIN — LIDOCAINE HYDROCHLORIDE 50 MG: 10 INJECTION, SOLUTION EPIDURAL; INFILTRATION; INTRACAUDAL at 07:33

## 2023-05-31 RX ADMIN — PROPOFOL 40 MG: 10 INJECTION, EMULSION INTRAVENOUS at 07:35

## 2023-05-31 RX ADMIN — SODIUM CHLORIDE, SODIUM LACTATE, POTASSIUM CHLORIDE, AND CALCIUM CHLORIDE: .6; .31; .03; .02 INJECTION, SOLUTION INTRAVENOUS at 07:10

## 2023-05-31 RX ADMIN — PROPOFOL 100 MG: 10 INJECTION, EMULSION INTRAVENOUS at 07:33

## 2023-05-31 RX ADMIN — PROPOFOL 30 MG: 10 INJECTION, EMULSION INTRAVENOUS at 07:39

## 2023-05-31 RX ADMIN — PROPOFOL 30 MG: 10 INJECTION, EMULSION INTRAVENOUS at 07:41

## 2023-05-31 NOTE — ANESTHESIA PREPROCEDURE EVALUATION
Procedure:  COLONOSCOPY  EGD      1  Chronic GERD:  -Recommended pantoprazole 40 mg in the morning  -Famotidine in the evening  -Dietary modification  -We will proceed with an upper endoscopy to exclude any significant esophagitis or Cabral's     2  Chronic loose stools: Frequent history of loose stools for many years  -Most likely baseline function for the patient  -Recommend proceeding with a colonoscopy the same time, we will biopsy for celiac and microscopic colitis  -Discussed with him risks of procedures including bleeding, surgery, perforation, missed polyp detection rate     Katie Coburn was seen today for gerd and screening colonoscopy      Diagnoses and all orders for this visit:     Gastroesophageal reflux disease without esophagitis  -     pantoprazole (PROTONIX) 40 mg tablet; Take 1 tablet (40 mg total) by mouth daily  -     EGD; Future     Diarrhea, unspecified type  -     Colonoscopy; Future     Other orders  -     Diet NPO; Sips with meds; Standing  -     Void on call to OR; Standing    Relevant Problems   GI/HEPATIC   (+) Esophageal reflux      MUSCULOSKELETAL   (+) Chronic low back pain   (+) Disc degeneration, lumbar   (+) Lumbar spondylosis      NEURO/PSYCH   (+) Anxiety   (+) Chronic low back pain   (+) Other headache syndrome        Physical Exam    Airway    Mallampati score: II  TM Distance: >3 FB  Neck ROM: full     Dental       Cardiovascular  Cardiovascular exam normal    Pulmonary  Pulmonary exam normal     Other Findings        Anesthesia Plan  ASA Score- 2     Anesthesia Type- IV sedation with anesthesia with ASA Monitors  Additional Monitors:   Airway Plan:           Plan Factors-Exercise tolerance (METS): >4 METS  Chart reviewed  Existing labs reviewed  Patient summary reviewed  Patient is not a current smoker  Induction- intravenous  Postoperative Plan-     Informed Consent- Anesthetic plan and risks discussed with patient    I personally reviewed this patient with the CRNA  Discussed and agreed on the Anesthesia Plan with the CRNA  Lauren Sarah

## 2023-05-31 NOTE — H&P
"History and Physical -  [facility]  Santa Lopez  42 y o  male MRN: [id number]    HPI: Santa Lopez  is a 42y o  year old male who presents with chronic GERD and loose stools  Review of Systems    Historical Information   Past Medical History:   Diagnosis Date   • Asthma    • Esophageal reflux      Past Surgical History:   Procedure Laterality Date   • CYST REMOVAL Right     index finger   • WISDOM TOOTH EXTRACTION       Social History   Social History     Substance and Sexual Activity   Alcohol Use Yes    Comment: social     Social History     Substance and Sexual Activity   Drug Use No     Social History     Tobacco Use   Smoking Status Never   Smokeless Tobacco Never     Family History   Problem Relation Age of Onset   • No Known Problems Mother    • Hyperlipidemia Father    • Hypertension Father    • Cancer Maternal Grandfather    • Hypertension Paternal Grandmother    • Hypertension Paternal Grandfather    • Colon cancer Maternal Aunt        Meds/Allergies     (Not in a hospital admission)      No Known Allergies    Objective     /65   Pulse 82   Temp (!) 96 5 °F (35 8 °C) (Temporal)   Resp 20   Ht 5' 10\" (1 778 m)   Wt 83 kg (183 lb)   SpO2 100%   BMI 26 26 kg/m²       PHYSICAL EXAM    Gen: NAD  CV: RRR  CHEST: Clear  ABD: soft, NT/ND  EXT: no edema  Neuro: AAO      ASSESSMENT/PLAN:  This is a 42y o  year old male here for chronic GERD and loose stools       PLAN:   Procedure: egd/colonoscopy    "

## 2023-05-31 NOTE — ANESTHESIA POSTPROCEDURE EVALUATION
Post-Op Assessment Note    CV Status:  Stable  Pain Score: 0    Pain management: adequate     Mental Status:  Awake and sleepy   Hydration Status:  Euvolemic   PONV Controlled:  Controlled   Airway Patency:  Patent      Post Op Vitals Reviewed: Yes      Staff: CRNA, Anesthesiologist         No notable events documented      BP 97/53 (05/31/23 0802)    Temp (!) 97 2 °F (36 2 °C) (05/31/23 0802)    Pulse 71 (05/31/23 0802)   Resp 12 (05/31/23 0802)    SpO2 97 % (05/31/23 0802)

## 2023-06-05 PROCEDURE — 88305 TISSUE EXAM BY PATHOLOGIST: CPT | Performed by: PATHOLOGY

## 2023-11-22 DIAGNOSIS — L65.9 ALOPECIA: Primary | ICD-10-CM

## 2023-11-22 RX ORDER — FINASTERIDE 1 MG/1
1 TABLET, FILM COATED ORAL DAILY
Qty: 90 TABLET | Refills: 1 | Status: SHIPPED | OUTPATIENT
Start: 2023-11-22

## 2023-12-27 DIAGNOSIS — K21.9 GASTROESOPHAGEAL REFLUX DISEASE WITHOUT ESOPHAGITIS: ICD-10-CM

## 2023-12-27 RX ORDER — PANTOPRAZOLE SODIUM 40 MG/1
40 TABLET, DELAYED RELEASE ORAL DAILY
Qty: 90 TABLET | Refills: 3 | Status: SHIPPED | OUTPATIENT
Start: 2023-12-27

## 2023-12-29 ENCOUNTER — TELEPHONE (OUTPATIENT)
Age: 38
End: 2023-12-29

## 2023-12-29 NOTE — TELEPHONE ENCOUNTER
"Rec'd call from patient requesting NEW for \"BURNING PATCH\" on knee.    Patient states that it's been on-going for several months.    Informed patient of next available and he does not wish to wait that long. Offered to schedule and place on cancellation/wait list. Patient declined.    Patient has not yet seen PCP. I encouraged him to start there. If they were unable to treat/resolve, they would refer to derm. If they felt it was more urgent than next available, accommodations could be made.    Patient verbalized understanding and stated that he contact PCP.  "

## 2024-01-05 ENCOUNTER — OFFICE VISIT (OUTPATIENT)
Dept: FAMILY MEDICINE CLINIC | Facility: CLINIC | Age: 39
End: 2024-01-05
Payer: COMMERCIAL

## 2024-01-05 VITALS
HEART RATE: 70 BPM | TEMPERATURE: 98.4 F | RESPIRATION RATE: 18 BRPM | HEIGHT: 70 IN | DIASTOLIC BLOOD PRESSURE: 60 MMHG | OXYGEN SATURATION: 98 % | SYSTOLIC BLOOD PRESSURE: 90 MMHG | WEIGHT: 188 LBS | BODY MASS INDEX: 26.92 KG/M2

## 2024-01-05 DIAGNOSIS — L30.9 DERMATITIS: Primary | ICD-10-CM

## 2024-01-05 PROCEDURE — 99213 OFFICE O/P EST LOW 20 MIN: CPT | Performed by: FAMILY MEDICINE

## 2024-01-05 RX ORDER — TRIAMCINOLONE ACETONIDE 5 MG/G
CREAM TOPICAL 2 TIMES DAILY
Qty: 45 G | Refills: 1 | Status: SHIPPED | OUTPATIENT
Start: 2024-01-05

## 2024-01-05 NOTE — PROGRESS NOTES
FAMILY PRACTICE OFFICE VISIT       NAME: Robin Osborn Jr.  AGE: 38 y.o. SEX: male       : 1985        MRN: [id number]    DATE: 2024  TIME: 12:48 PM    Assessment and Plan     Problem List Items Addressed This Visit       Dermatitis - Primary     Dermatitis.  Patient with suspected eczema versus early psoriasis.  He was given prescription to initiate triamcinolone cream twice daily for up to 2 weeks at a time if needed.  If symptoms persist without much improvement over 2 weeks he will obtain consultation with dermatologist Dr. Candelaria Myers         Relevant Medications    triamcinolone (KENALOG) 0.5 % cream           Chief Complaint     Chief Complaint   Patient presents with    burning paches on skin      Both knee        History of Present Illness     Patient in the office with several months history of uncomfortable pink pruritic skin on bilateral patella area of his knees.  Patient denies any trauma to this area.  Symptoms are mildly worse when he is kneeling on carpet playing with his children.  Patient denies any similar type lesions in any other part of his body.  Patient was unable to obtain a timely appointment with dermatology.  He denies any recent illness        Review of Systems   Review of Systems   Constitutional: Negative.    Respiratory: Negative.     Cardiovascular: Negative.    Gastrointestinal: Negative.    Musculoskeletal: Negative.    Skin:  Positive for rash.       Active Problem List     Patient Active Problem List   Diagnosis    Chronic low back pain    Disc degeneration, lumbar    Lumbar disc herniation    Periodic health assessment, general screening, adult    Alopecia    Intervertebral disc disorder with radiculopathy of lumbar region    Patellofemoral dysfunction of left knee    Internal derangement of left knee    Anxiety    Other headache syndrome    Lumbar spondylosis    Folliculitis    Need for influenza vaccination    Esophageal reflux    Diarrhea    Dermatitis        Past Medical History:  Past Medical History:   Diagnosis Date    Asthma     Esophageal reflux     GERD (gastroesophageal reflux disease)     HL (hearing loss)     Migraine     Tinnitus        Past Surgical History:  Past Surgical History:   Procedure Laterality Date    CYST REMOVAL Right     index finger    WISDOM TOOTH EXTRACTION         Family History:  Family History   Problem Relation Age of Onset    No Known Problems Mother     Hyperlipidemia Father     Hypertension Father     Cancer Maternal Grandfather     Hypertension Paternal Grandmother     Hypertension Paternal Grandfather     Colon cancer Maternal Aunt        Social History:  Social History     Socioeconomic History    Marital status: /Civil Union     Spouse name: Not on file    Number of children: Not on file    Years of education: Not on file    Highest education level: Not on file   Occupational History    Not on file   Tobacco Use    Smoking status: Never    Smokeless tobacco: Never   Vaping Use    Vaping status: Never Used   Substance and Sexual Activity    Alcohol use: Yes     Comment: social    Drug use: No    Sexual activity: Yes   Other Topics Concern    Not on file   Social History Narrative    Not on file     Social Determinants of Health     Financial Resource Strain: Not on file   Food Insecurity: Not on file   Transportation Needs: Not on file   Physical Activity: Not on file   Stress: Not on file   Social Connections: Not on file   Intimate Partner Violence: Not on file   Housing Stability: Not on file       Objective     Vitals:    01/05/24 1040   BP: 90/60   Pulse: 70   Resp: 18   Temp: 98.4 °F (36.9 °C)   SpO2: 98%     Wt Readings from Last 3 Encounters:   01/05/24 85.3 kg (188 lb)   10/27/23 83 kg (183 lb)   05/31/23 83 kg (183 lb)       Physical Exam  Constitutional:       General: He is not in acute distress.     Appearance: Normal appearance. He is not ill-appearing.   Skin:     Findings: Rash present.      Comments:  "Patient with irregularly shaped papular pink dry skin on bilateral patella area of knees.  Right greater than left.  No tenderness to palpation of knee joint.  Normal range of motion.   Neurological:      Mental Status: He is alert.         Pertinent Laboratory/Diagnostic Studies:  Lab Results   Component Value Date    GLUCOSE 87 07/01/2015    BUN 20 01/16/2023    CREATININE 1.11 01/16/2023    CALCIUM 8.9 01/16/2023     07/01/2015    K 3.9 01/16/2023    CO2 29 01/16/2023     01/16/2023     Lab Results   Component Value Date    ALT 26 01/16/2023    AST 18 01/16/2023    ALKPHOS 72 01/16/2023    BILITOT 0.63 07/01/2015       Lab Results   Component Value Date    WBC 6.80 01/16/2023    HGB 14.9 01/16/2023    HCT 41.6 01/16/2023    MCV 88 01/16/2023     01/16/2023       No results found for: \"TSH\"    Lab Results   Component Value Date    CHOL 120 07/01/2015     Lab Results   Component Value Date    TRIG 176 (H) 01/16/2023     Lab Results   Component Value Date    HDL 39 (L) 01/16/2023     Lab Results   Component Value Date    LDLCALC 79 01/16/2023     No results found for: \"HGBA1C\"    Results for orders placed or performed during the hospital encounter of 05/31/23   Tissue Exam   Result Value Ref Range    Case Report       Surgical Pathology Report                         Case: [id number]                                   Authorizing Provider:  Fred Cabello MD            Collected:           05/31/2023 0736              Ordering Location:     [facility]        Received:            05/31/2023 1439                                     [facility]                                                    Pathologist:           Edinson Carl MD                                                           Specimens:   A) - Duodenum, duodenal BX r/o celiac                                                               B) - Stomach, cold bx gastric r/o H.pylori                                      " "                    C) - Polyp, Stomach/Small Intestine, cold forcep gastric polyps                                     D) - Colon, random bx colon                                                                Final Diagnosis       A. Small intestine, \"Duodenal biopsy,\" Biopsy:  - Benign duodenal mucosa  - No villous atrophy, intraepithelial lymphocytosis or crypt hyperplasia; negative for features of malabsorptive enteropathy  - Negative for chronic or active duodenitis, dysplasia or malignancy    B. Stomach, \"Cold biopsy gastric,\" Biopsy:  - Antral mucosa with mild chronic inactive gastritis  - Negative for intestinal metaplasia  - Negative for curvilinear Helicobacter microorganisms    C. Stomach, \"Cold forcep gastric polyps,\" Biopsy:  - Fundic gland polyp  - Negative for intestinal metaplasia  - Negative for curvilinear Helicobacter microorganisms    D. Colon, \"Random biopsies,\" Biopsy:  - Benign colonic mucosa with intact glandular architecture  - Negative for lymphocytic, collagenous, or active colitis  - Negative for acute colitis  - Negative for granulomas, dysplasia, or carcinoma        Additional Information       All reported additional testing was performed with appropriately reactive controls.  These tests were developed and their performance characteristics determined by [facility] or appropriate performing facility, though some tests may be performed on tissues which have not been validated for performance characteristics (such as staining performed on alcohol exposed cell blocks and decalcified tissues).  Results should be interpreted with caution and in the context of the patients’ clinical condition. These tests may not be cleared or approved by the U.S. Food and Drug Administration, though the FDA has determined that such clearance or approval is not necessary. These tests are used for clinical purposes and they should not be regarded as investigational or for research. This " "laboratory has been approved by CLIA 88, designated as a high-complexity laboratory and is qualified to perform these tests.  Interpretation performed at [facility], [facility] [address] [place] [address]        Gross Description       A. The specimen is received in formalin, labeled with the patient's name and hospital number, and is designated \" duodenal biopsy\".  It consists of a 1.5 x 1.0 x 0.1 cm aggregate of tan-pink tissue fragments.  Entirely submitted in a screen cassette.  B. The specimen is received in formalin, labeled with the patient's name and hospital number, and is designated \" cold biopsy gastric\".  It consists of three 0.3 cm tan-brown tissue fragments.  Entirely submitted in a screen cassette.  C. The specimen is received in formalin, labeled with the patient's name and hospital number, and is designated \" cold forcep gastric polyps\".  It consists of four 0.2 cm tan-brown tissue fragments.  Entirely submitted in a screen cassette.  D. The specimen is received in formalin, labeled with the patient's name and hospital number, and is designated \" random biopsies:\".  It consists of a 1.0 x 0.8 x 0.1 cm aggregate of tan-pink tissue fragments.  Entirely submitted in a screen cassette.    Note: The estimated total formalin fixation time based upon information provided by the submitting clinician and the standard processing schedule is under 72 hours.  ESorrentino      Clinical Information       · The duodenum appeared normal. Performed random biopsy using biopsy forceps.  · Mild erythematous mucosa in the antrum; performed cold forceps biopsy  · The cardia appeared normal.  · Polyp measuring 5-9 mm in the fundus of the stomach; performed cold forceps biopsy  · The esophagus appeared normal. Z-line is 39 cm from the incisors.       No orders of the defined types were placed in this encounter.      ALLERGIES:  No Known Allergies    Current Medications     Current Outpatient Medications   Medication Sig " Dispense Refill    albuterol (PROVENTIL HFA,VENTOLIN HFA) 90 mcg/act inhaler Inhale 2 puffs every 4 (four) hours as needed (as needed) 1 Inhaler 3    multivitamin (THERAGRAN) TABS Take 1 tablet by mouth daily      pantoprazole (PROTONIX) 40 mg tablet Take 1 tablet (40 mg total) by mouth daily 90 tablet 3    ranitidine (ZANTAC) 150 mg tablet Take 150 mg by mouth 2 (two) times a day      triamcinolone (KENALOG) 0.5 % cream Apply topically 2 (two) times a day 45 g 1    cetirizine (ZyrTEC) 10 mg tablet Take 10 mg by mouth daily (Patient not taking: Reported on 1/5/2024)      finasteride (PROPECIA) 1 MG tablet Take 1 tablet (1 mg total) by mouth daily 90 tablet 1     No current facility-administered medications for this visit.         Health Maintenance     Health Maintenance   Topic Date Due    PT PLAN OF CARE  07/25/2019    COVID-19 Vaccine (4 - 2023-24 season) 09/01/2023    Annual Physical  09/30/2023    Influenza Vaccine (1) 06/30/2024 (Originally 9/1/2023)    Depression Screening  01/05/2025    DTaP,Tdap,and Td Vaccines (3 - Td or Tdap) 02/10/2031    Colorectal Cancer Screening  05/28/2033    HIV Screening  Completed    Hepatitis C Screening  Completed    Pneumococcal Vaccine: Pediatrics (0 to 5 Years) and At-Risk Patients (6 to 64 Years)  Aged Out    HIB Vaccine  Aged Out    IPV Vaccine  Aged Out    Hepatitis A Vaccine  Aged Out    Meningococcal ACWY Vaccine  Aged Out    HPV Vaccine  Aged Out     Immunization History   Administered Date(s) Administered    COVID-19 PFIZER VACCINE 0.3 ML IM 12/21/2020, 01/11/2021, 10/12/2021    INFLUENZA 11/19/2021    Meningococcal Polysaccharide (MPSV4) 07/17/2002    Td (adult), adsorbed 11/09/1998    Tdap 02/10/2021       Kevin Torres MD

## 2024-01-05 NOTE — ASSESSMENT & PLAN NOTE
Dermatitis.  Patient with suspected eczema versus early psoriasis.  He was given prescription to initiate triamcinolone cream twice daily for up to 2 weeks at a time if needed.  If symptoms persist without much improvement over 2 weeks he will obtain consultation with dermatologist Dr. Candelaria Myers

## 2024-02-21 PROBLEM — Z00.00 PERIODIC HEALTH ASSESSMENT, GENERAL SCREENING, ADULT: Status: RESOLVED | Noted: 2018-08-01 | Resolved: 2024-02-21

## 2024-02-22 ENCOUNTER — HOSPITAL ENCOUNTER (EMERGENCY)
Facility: HOSPITAL | Age: 39
Discharge: HOME | End: 2024-02-22
Attending: EMERGENCY MEDICINE
Payer: COMMERCIAL

## 2024-02-22 ENCOUNTER — APPOINTMENT (EMERGENCY)
Dept: RADIOLOGY | Facility: HOSPITAL | Age: 39
End: 2024-02-22
Payer: COMMERCIAL

## 2024-02-22 VITALS
OXYGEN SATURATION: 100 % | WEIGHT: 189.15 LBS | DIASTOLIC BLOOD PRESSURE: 66 MMHG | BODY MASS INDEX: 27.14 KG/M2 | HEART RATE: 97 BPM | SYSTOLIC BLOOD PRESSURE: 133 MMHG | RESPIRATION RATE: 18 BRPM | TEMPERATURE: 98.2 F

## 2024-02-22 DIAGNOSIS — R07.89 LEFT-SIDED CHEST WALL PAIN: Primary | ICD-10-CM

## 2024-02-22 DIAGNOSIS — T14.8XXA MUSCLE STRAIN: ICD-10-CM

## 2024-02-22 PROCEDURE — 71101 X-RAY EXAM UNILAT RIBS/CHEST: CPT

## 2024-02-22 PROCEDURE — 99283 EMERGENCY DEPT VISIT LOW MDM: CPT

## 2024-02-22 PROCEDURE — 99284 EMERGENCY DEPT VISIT MOD MDM: CPT

## 2024-02-22 RX ORDER — NAPROXEN 375 MG/1
375 TABLET ORAL 2 TIMES DAILY WITH MEALS
Qty: 20 TABLET | Refills: 0 | Status: SHIPPED | OUTPATIENT
Start: 2024-02-22

## 2024-02-22 RX ORDER — ACETAMINOPHEN 325 MG/1
650 TABLET ORAL ONCE
Status: DISCONTINUED | OUTPATIENT
Start: 2024-02-22 | End: 2024-02-22 | Stop reason: HOSPADM

## 2024-02-22 RX ORDER — METHOCARBAMOL 500 MG/1
500 TABLET, FILM COATED ORAL 2 TIMES DAILY
Qty: 20 TABLET | Refills: 0 | Status: SHIPPED | OUTPATIENT
Start: 2024-02-22

## 2024-02-22 RX ORDER — IBUPROFEN 400 MG/1
400 TABLET ORAL ONCE
Status: DISCONTINUED | OUTPATIENT
Start: 2024-02-22 | End: 2024-02-22 | Stop reason: HOSPADM

## 2024-02-22 NOTE — ED PROVIDER NOTES
"History  Chief Complaint   Patient presents with    Rib Pain     PT reports \"waking up a week ago with upper left sided back pain that radiates in tot he left rib cage making it difficult to breathe\"     38-year-old male presents today with concerns of left sided back/rib pain. Atraumatic.  Did have a viral illness that ended approximately 1 week ago.  Patient states this been going approximately a week.  Patient states he does do exercise as well including heavy lifting.  Denies any nausea or vomiting.  Patient states that when he takes a big deep breath and it does make it difficult to breathe.  Does have a history of asthma does not feel the same.  No substernal chest pain or chest pain rating to the left arm and to the neck.        Prior to Admission Medications   Prescriptions Last Dose Informant Patient Reported? Taking?   albuterol (PROVENTIL HFA,VENTOLIN HFA) 90 mcg/act inhaler  Self No No   Sig: Inhale 2 puffs every 4 (four) hours as needed (as needed)   cetirizine (ZyrTEC) 10 mg tablet  Self Yes No   Sig: Take 10 mg by mouth daily   Patient not taking: Reported on 1/5/2024   finasteride (PROPECIA) 1 MG tablet   No No   Sig: Take 1 tablet (1 mg total) by mouth daily   multivitamin (THERAGRAN) TABS  Self Yes No   Sig: Take 1 tablet by mouth daily   pantoprazole (PROTONIX) 40 mg tablet   No No   Sig: Take 1 tablet (40 mg total) by mouth daily   ranitidine (ZANTAC) 150 mg tablet  Self Yes No   Sig: Take 150 mg by mouth 2 (two) times a day   triamcinolone (KENALOG) 0.5 % cream   No No   Sig: Apply topically 2 (two) times a day      Facility-Administered Medications: None       Past Medical History:   Diagnosis Date    Asthma     Esophageal reflux     GERD (gastroesophageal reflux disease)     HL (hearing loss)     Migraine     Tinnitus        Past Surgical History:   Procedure Laterality Date    CYST REMOVAL Right     index finger    WISDOM TOOTH EXTRACTION         Family History   Problem Relation Age of Onset "    No Known Problems Mother     Hyperlipidemia Father     Hypertension Father     Cancer Maternal Grandfather     Hypertension Paternal Grandmother     Hypertension Paternal Grandfather     Colon cancer Maternal Aunt      I have reviewed and agree with the history as documented.    E-Cigarette/Vaping    E-Cigarette Use Never User      E-Cigarette/Vaping Substances    Nicotine No     THC No     CBD No     Flavoring No     Other No     Unknown No      Social History     Tobacco Use    Smoking status: Never    Smokeless tobacco: Never   Vaping Use    Vaping status: Never Used   Substance Use Topics    Alcohol use: Yes     Comment: social    Drug use: No       Review of Systems   Constitutional:  Negative for chills and fever.   HENT:  Negative for ear pain and sore throat.    Eyes:  Negative for pain and visual disturbance.   Respiratory:  Positive for chest tightness. Negative for apnea, cough, choking, shortness of breath, wheezing and stridor.    Cardiovascular:  Positive for chest pain. Negative for palpitations and leg swelling.   Gastrointestinal:  Negative for abdominal pain, constipation, diarrhea, nausea and vomiting.   Genitourinary:  Negative for dysuria and hematuria.   Musculoskeletal:  Negative for arthralgias and back pain.   Skin:  Negative for color change and rash.   Neurological:  Negative for seizures and syncope.   All other systems reviewed and are negative.      Physical Exam  Physical Exam  Vitals and nursing note reviewed.   Constitutional:       General: He is not in acute distress.     Appearance: He is well-developed. He is ill-appearing.   HENT:      Head: Normocephalic and atraumatic.   Eyes:      Conjunctiva/sclera: Conjunctivae normal.   Cardiovascular:      Rate and Rhythm: Normal rate and regular rhythm.      Pulses: Normal pulses.      Heart sounds: Normal heart sounds. No murmur heard.     No friction rub. No gallop.   Pulmonary:      Effort: Pulmonary effort is normal. No  respiratory distress.      Breath sounds: Normal breath sounds. No stridor. No wheezing, rhonchi or rales.   Chest:      Chest wall: Tenderness present.   Abdominal:      Palpations: Abdomen is soft.      Tenderness: There is no abdominal tenderness. There is no left CVA tenderness.   Musculoskeletal:         General: Tenderness (Posterior left rib area, no midline tenderness of the thoracic or lumbar spine.  No overlying skin changes.) present. No swelling.      Cervical back: Neck supple.   Skin:     General: Skin is warm and dry.      Capillary Refill: Capillary refill takes less than 2 seconds.   Neurological:      Mental Status: He is alert.   Psychiatric:         Mood and Affect: Mood normal.         Vital Signs  ED Triage Vitals [02/22/24 0828]   Temperature Pulse Respirations Blood Pressure SpO2   98.2 °F (36.8 °C) 97 18 133/66 100 %      Temp Source Heart Rate Source Patient Position - Orthostatic VS BP Location FiO2 (%)   Oral Monitor Sitting Right arm --      Pain Score       7           Vitals:    02/22/24 0828   BP: 133/66   Pulse: 97   Patient Position - Orthostatic VS: Sitting         Visual Acuity      ED Medications  Medications - No data to display      Diagnostic Studies  Results Reviewed       None                   XR ribs with pa chest min 3 views LEFT   ED Interpretation by Fabrizio Granados PA-C (02/22 0968)   No acute fracture.  No cardiopulmonary process identified.      Final Result by Vivek Spencer MD (02/22 1221)      No evidence of a rib fracture.      No acute cardiopulmonary disease.      Workstation performed: [id number]                    Procedures  Procedures         ED Course                                             Medical Decision Making  Is a 38-year-old male presents today with concerns of atraumatic left posterior rib pain, causing some shortness of breath when he takes a deep breath, this started approximately 1 week ago and just not going away.  Denies any exertional  "chest pain or radiation of this pain into the arm or neck.  Does have a history of asthma however this does not feel the same.  Chest x-ray, rib series left to rule out fracture.  Lung sounds clear and present in all quadrants.  Tenderness on exam.  Motrin and Tylenol for pain.  Patient requesting medications that do not make him tired as he has to work today.  X-ray did not reveal any acute fracture or dislocation.  No acute cardiopulmonary process identified.  Robaxin and naproxen sent to patient's pharmacy.  ------------------------------------------------------------  Strict return precautions discussed. Patient at time of discharge well-appearing in no acute distress, all questions answered. Patient agreeable to plan.  Patient's vitals, lab/imaging results, diagnosis, and treatment plan were discussed with the patient. All new/changed medications were discussed with patient, specifically, route of administration, how often and when to take, and where they can be picked up. Strict return precautions as well as close follow up with PCP was discussed with the patient and the patient was agreeable to my recommendations.  Patient verbally acknowledged understanding of the above communications. All labs reviewed and utilized in the medical decision making process (if labs were ordered). Portions of the record may have been created with voice recognition software.  Occasional wrong word or \"sound a like\" substitutions may have occurred due to the inherent limitations of voice recognition software.  Read the chart carefully and recognize, using context, where substitutions have occurred.      Amount and/or Complexity of Data Reviewed  Radiology: ordered and independent interpretation performed.    Risk  OTC drugs.  Prescription drug management.             Disposition  Final diagnoses:   Left-sided chest wall pain   Muscle strain     Time reflects when diagnosis was documented in both MDM as applicable and the " Disposition within this note       Time User Action Codes Description Comment    2/22/2024  9:47 AM Fabrizio Granados [R07.89] Left-sided chest wall pain     2/22/2024  9:47 AM Fabrizio Granados [T14.8XXA] Muscle strain           ED Disposition       ED Disposition   Discharge    Condition   Stable    Date/Time   Thu Feb 22, 2024  9:47 AM    Comment   Robin Osborn Jr. discharge to home/self care.                   Follow-up Information       Follow up With Specialties Details Why Contact Info Additional Information    [facility] Emergency Medicine Go to  If symptoms worsen [facility] [address]  [phone] [facility], [address] [place] [address], [id number]    Kevin Torres MD Family Medicine Schedule an appointment as soon as possible for a visit  As needed [address] [place] [address] [id number] [phone]               Discharge Medication List as of 2/22/2024  9:48 AM        START taking these medications    Details   methocarbamol (ROBAXIN) 500 mg tablet Take 1 tablet (500 mg total) by mouth 2 (two) times a day, Starting Thu 2/22/2024, Normal      naproxen (NAPROSYN) 375 mg tablet Take 1 tablet (375 mg total) by mouth 2 (two) times a day with meals, Starting Thu 2/22/2024, Normal           CONTINUE these medications which have NOT CHANGED    Details   albuterol (PROVENTIL HFA,VENTOLIN HFA) 90 mcg/act inhaler Inhale 2 puffs every 4 (four) hours as needed (as needed), Starting Thu 2/6/2020, Normal      cetirizine (ZyrTEC) 10 mg tablet Take 10 mg by mouth daily, Historical Med      finasteride (PROPECIA) 1 MG tablet Take 1 tablet (1 mg total) by mouth daily, Starting Wed 11/22/2023, Normal      multivitamin (THERAGRAN) TABS Take 1 tablet by mouth daily, Historical Med      pantoprazole (PROTONIX) 40 mg tablet Take 1 tablet (40 mg total) by mouth daily, Starting Wed 12/27/2023, Normal       ranitidine (ZANTAC) 150 mg tablet Take 150 mg by mouth 2 (two) times a day, Historical Med      triamcinolone (KENALOG) 0.5 % cream Apply topically 2 (two) times a day, Starting Fri 1/5/2024, Normal             No discharge procedures on file.    PDMP Review       None            ED Provider  Electronically Signed by             Fabrizio Granados PA-C  02/22/24 3916

## 2024-02-23 ENCOUNTER — VBI (OUTPATIENT)
Dept: FAMILY MEDICINE CLINIC | Facility: CLINIC | Age: 39
End: 2024-02-23

## 2024-02-23 NOTE — TELEPHONE ENCOUNTER
02/23/24 11:26 AM    Patient contacted post ED visit, [facility] department spoke with patient/caregiver and outreach was successful.    Thank you.  Sindhu Verduzco MA  [facility]

## 2024-05-20 ENCOUNTER — OFFICE VISIT (OUTPATIENT)
Dept: PAIN MEDICINE | Facility: CLINIC | Age: 39
End: 2024-05-20
Payer: COMMERCIAL

## 2024-05-20 VITALS
HEIGHT: 70 IN | BODY MASS INDEX: 27.13 KG/M2 | HEART RATE: 66 BPM | DIASTOLIC BLOOD PRESSURE: 60 MMHG | SYSTOLIC BLOOD PRESSURE: 114 MMHG | WEIGHT: 189.5 LBS

## 2024-05-20 DIAGNOSIS — M47.816 LUMBAR SPONDYLOSIS: Primary | ICD-10-CM

## 2024-05-20 PROCEDURE — 99214 OFFICE O/P EST MOD 30 MIN: CPT | Performed by: ANESTHESIOLOGY

## 2024-05-20 RX ORDER — ESCITALOPRAM OXALATE 5 MG/1
TABLET ORAL
COMMUNITY
Start: 2024-05-15

## 2024-05-20 NOTE — PROGRESS NOTES
Assessment:  1. Lumbar spondylosis        Plan:  The patient is experiencing a recurrence of pain in the right lower back related to facet arthritis with positive facet loading.  At this time, discussed repeating the right lumbar medial branch radiofrequency ablation which provided 2 years relief.  He would like to proceed and will be scheduled under fluoroscopic guidance.    My impressions and treatment recommendations were discussed in detail with the patient who verbalized understanding and had no further questions.  Discharge instructions were provided. I personally saw and examined the patient and I agree with the above discussed plan of care.    Orders Placed This Encounter   Procedures   • FL spine and pain procedure     Standing Status:   Future     Standing Expiration Date:   5/20/2028     Order Specific Question:   Reason for Exam:     Answer:   Right L3-5 MB RFA     Order Specific Question:   Anticoagulant hold needed?     Answer:   No     New Medications Ordered This Visit   Medications   • escitalopram (LEXAPRO) 5 mg tablet       History of Present Illness:  Robin Osborn Jr. is a 38 y.o. male who presents for a follow up office visit in regards to Back Pain (Rt side ).   The patient has a history of lumbar spondylosis and returns for follow-up.  He was last seen in April 2022 at which time he underwent bilateral lumbar medial branch radiofrequency ablation.  He reports that he has done well since that procedure up until about last month when symptoms recurred on the right side.  He experienced severe pain in the right lower back which has since abated slightly but can get intermittently worse.  Symptoms are in the same spot that he had the ablation done but worse on the right.  Pain can be intense rated 8/10 on a numeric rating scale and does not radiate into the leg.    I have personally reviewed and/or updated the patient's past medical history, past surgical history, family history, social history,  current medications, allergies, and vital signs today.     Review of Systems   Respiratory:  Negative for shortness of breath.    Cardiovascular:  Negative for chest pain.   Gastrointestinal:  Negative for constipation, diarrhea, nausea and vomiting.   Musculoskeletal:  Positive for back pain. Negative for arthralgias, gait problem, joint swelling and myalgias.   Skin:  Negative for rash.   Neurological:  Negative for dizziness, seizures and weakness.   All other systems reviewed and are negative.      Patient Active Problem List   Diagnosis   • Chronic low back pain   • Disc degeneration, lumbar   • Lumbar disc herniation   • Alopecia   • Intervertebral disc disorder with radiculopathy of lumbar region   • Patellofemoral dysfunction of left knee   • Internal derangement of left knee   • Anxiety   • Other headache syndrome   • Lumbar spondylosis   • Folliculitis   • Need for influenza vaccination   • Esophageal reflux   • Diarrhea   • Dermatitis       Past Medical History:   Diagnosis Date   • Asthma    • Esophageal reflux    • GERD (gastroesophageal reflux disease)    • HL (hearing loss)    • Migraine    • Tinnitus        Past Surgical History:   Procedure Laterality Date   • CYST REMOVAL Right     index finger   • WISDOM TOOTH EXTRACTION         Family History   Problem Relation Age of Onset   • No Known Problems Mother    • Hyperlipidemia Father    • Hypertension Father    • Cancer Maternal Grandfather    • Hypertension Paternal Grandmother    • Hypertension Paternal Grandfather    • Colon cancer Maternal Aunt        Social History     Occupational History   • Not on file   Tobacco Use   • Smoking status: Never   • Smokeless tobacco: Never   Vaping Use   • Vaping status: Never Used   Substance and Sexual Activity   • Alcohol use: Yes     Comment: social   • Drug use: No   • Sexual activity: Yes       Current Outpatient Medications on File Prior to Visit   Medication Sig   • albuterol (PROVENTIL HFA,VENTOLIN HFA)  "90 mcg/act inhaler Inhale 2 puffs every 4 (four) hours as needed (as needed)   • escitalopram (LEXAPRO) 5 mg tablet    • finasteride (PROPECIA) 1 MG tablet Take 1 tablet (1 mg total) by mouth daily   • multivitamin (THERAGRAN) TABS Take 1 tablet by mouth daily   • pantoprazole (PROTONIX) 40 mg tablet Take 1 tablet (40 mg total) by mouth daily   • ranitidine (ZANTAC) 150 mg tablet Take 150 mg by mouth 2 (two) times a day   • cetirizine (ZyrTEC) 10 mg tablet Take 10 mg by mouth daily (Patient not taking: Reported on 1/5/2024)   • methocarbamol (ROBAXIN) 500 mg tablet Take 1 tablet (500 mg total) by mouth 2 (two) times a day (Patient not taking: Reported on 5/20/2024)   • naproxen (NAPROSYN) 375 mg tablet Take 1 tablet (375 mg total) by mouth 2 (two) times a day with meals (Patient not taking: Reported on 5/20/2024)     No current facility-administered medications on file prior to visit.       No Known Allergies    Physical Exam:    /60   Pulse 66   Ht 5' 10\" (1.778 m)   Wt 86 kg (189 lb 8 oz)   BMI 27.19 kg/m²     Constitutional:normal, well developed, well nourished, alert, in no distress and non-toxic and no overt pain behavior.  Eyes:anicteric  HEENT:grossly intact  Neck:supple, symmetric, trachea midline and no masses   Pulmonary:even and unlabored  Cardiovascular:No edema or pitting edema present  Skin:Normal without rashes or lesions and well hydrated  Psychiatric:Mood and affect appropriate  Neurologic:Cranial Nerves II-XII grossly intact  Musculoskeletal: Right lower back tenderness over the right L4-5 and L5-S1 facets with positive facet loading; 5/5 strength in the bilateral lower extremity flexors/extensors    Imaging    MRI LUMBAR SPINE WITHOUT CONTRAST (10/16/2021)     INDICATION: M51.16: Intervertebral disc disorders with radiculopathy, lumbar region.     COMPARISON:  3/15/2018     TECHNIQUE:  Sagittal T1, sagittal T2, sagittal inversion recovery, axial T1 and axial T2, coronal T2.    IMAGE " QUALITY:  Diagnostic     FINDINGS:     VERTEBRAL BODIES:  There are 5 lumbar type vertebral bodies.  Normal alignment of the lumbar spine.  No spondylolysis or spondylolisthesis. No scoliosis.  No compression fracture.    Normal marrow signal is identified within the visualized bony   structures.  No discrete marrow lesion.     SACRUM:  Normal signal within the sacrum. No evidence of insufficiency or stress fracture.     DISTAL CORD AND CONUS:  Normal size and signal within the distal cord and conus.     PARASPINAL SOFT TISSUES:  Paraspinal soft tissues are unremarkable.     LOWER THORACIC DISC SPACES:  Normal disc height and signal.  No disc herniation, canal stenosis or foraminal narrowing.     LUMBAR DISC SPACES:     L1-L2:  Normal.     L2-L3:  Mild bulge with superimposed left foraminal disc herniation, extrusion type exhibiting both caudal and cranial migration and resulting in mild to moderate left foraminal stenosis.  The disc herniation abuts the left L2 nerve root best seen on   series 7 image 14.  Correlate clinically for radicular symptoms.  Central canal and right neural foramen both remain patent.  Findings are stable.     L3-L4:  Minor bulge with superimposed right foraminal disc protrusion best seen on series 7 image 23.  Facet arthrosis evident.  There is mild to moderate right foraminal narrowing.  Correlate clinically for right L3 radiculopathy.  Central canal and   left neural foramen both remain patent.  Findings are stable.     L4-L5:  Disc desiccation with moderate loss of disc height.  Diffuse disc bulge with superimposed left paracentral disc extrusion exhibiting mild cranial migration.  There is prominent narrowing of the left subarticular recess concerning for impingement   of the descending left L5 nerve root.  Mild to moderate left central canal stenosis.  There is mild to moderate bilateral foraminal narrowing evident.  Findings are stable.     L5-S1:  Normal.

## 2024-05-28 DIAGNOSIS — L65.9 ALOPECIA: ICD-10-CM

## 2024-05-28 DIAGNOSIS — K21.9 GASTROESOPHAGEAL REFLUX DISEASE WITHOUT ESOPHAGITIS: ICD-10-CM

## 2024-05-28 RX ORDER — FINASTERIDE 1 MG/1
1 TABLET, FILM COATED ORAL DAILY
Qty: 90 TABLET | Refills: 2 | Status: SHIPPED | OUTPATIENT
Start: 2024-05-28

## 2024-05-30 RX ORDER — PANTOPRAZOLE SODIUM 40 MG/1
40 TABLET, DELAYED RELEASE ORAL DAILY
Qty: 90 TABLET | Refills: 1 | Status: SHIPPED | OUTPATIENT
Start: 2024-05-30

## 2024-06-05 ENCOUNTER — TELEPHONE (OUTPATIENT)
Age: 39
End: 2024-06-05

## 2024-06-05 NOTE — TELEPHONE ENCOUNTER
Caller: Patient     Doctor: Ángela     Reason for call: Patient need to reschedule Friday procedure due to being sick.     Please advise     Call back#: [phone]

## 2024-06-28 DIAGNOSIS — K21.9 GASTROESOPHAGEAL REFLUX DISEASE WITHOUT ESOPHAGITIS: Primary | ICD-10-CM

## 2024-06-28 RX ORDER — SUCRALFATE 1 G/1
1 TABLET ORAL 4 TIMES DAILY
Qty: 90 TABLET | Refills: 0 | Status: SHIPPED | OUTPATIENT
Start: 2024-06-28

## 2024-07-16 ENCOUNTER — OFFICE VISIT (OUTPATIENT)
Dept: DERMATOLOGY | Facility: CLINIC | Age: 39
End: 2024-07-16
Payer: COMMERCIAL

## 2024-07-16 VITALS — TEMPERATURE: 97 F | BODY MASS INDEX: 26.54 KG/M2 | WEIGHT: 185 LBS

## 2024-07-16 DIAGNOSIS — L85.8 KERATOSIS PILARIS: Primary | ICD-10-CM

## 2024-07-16 DIAGNOSIS — L72.0 EPIDERMAL CYST: ICD-10-CM

## 2024-07-16 DIAGNOSIS — L85.3 XEROSIS OF SKIN: ICD-10-CM

## 2024-07-16 PROCEDURE — 87077 CULTURE AEROBIC IDENTIFY: CPT | Performed by: DERMATOLOGY

## 2024-07-16 PROCEDURE — 99204 OFFICE O/P NEW MOD 45 MIN: CPT | Performed by: DERMATOLOGY

## 2024-07-16 PROCEDURE — 87186 SC STD MICRODIL/AGAR DIL: CPT | Performed by: DERMATOLOGY

## 2024-07-16 PROCEDURE — 87205 SMEAR GRAM STAIN: CPT | Performed by: DERMATOLOGY

## 2024-07-16 PROCEDURE — 87147 CULTURE TYPE IMMUNOLOGIC: CPT | Performed by: DERMATOLOGY

## 2024-07-16 PROCEDURE — 87070 CULTURE OTHR SPECIMN AEROBIC: CPT | Performed by: DERMATOLOGY

## 2024-07-16 NOTE — PATIENT INSTRUCTIONS
EPIDERMAL INCLUSION CYST     Physical Exam:  Anatomic Location Affected:  right knee  Morphological Description:  cystic nodule  Pertinent Positives:  Pertinent Negatives:    Additional History of Present Condition:  patient notes that painful, had about 1  month    Assessment and Plan:  Based on a thorough discussion of this condition and the management approach to it (including a comprehensive discussion of the known risks, side effects and potential benefits of treatment), the patient (family) agrees to implement the following specific plan:    Open and drainage of cyst  Wound culture taken today      What are epidermal inclusion cysts?  Epidermal inclusion cysts are the most common, benign cutaneous cysts. There are many different names for epidermal inclusion cysts, including epidermoid cyst, epidermal cyst, infundibular cyst, inclusion cyst, and keratin cyst. These cysts can occur anywhere on the body and typically present as nodules directly underneath the skin. There is often a visible pore or opening in the center. The cysts are freely moveable and can range from a few millimeters to several centimeters in diameter. The center of epidermoid cysts almost always contains keratin, which has a cheesy appearance, and not sebum. They also do not originate from sebaceous glands. Therefore, epidermal inclusion cysts are not the same as sebaceous cysts.    Cysts may remain stable or progressively enlarge over time. There are no reliable predictive factors to tell if an epidermal inclusion cyst will enlarge, become inflamed, or remain quiescent. Infected cysts tend to become larger, turn red, and are more noticeable to the patient. There may be accompanying pain and discomfort.     What causes epidermal inclusion cysts?  Epidermal inclusion cysts often appear out of the blue and are not contagious. They are due to a proliferation of epidermal cells within the dermis and are more common in men than women. They occur  more frequently in patients in their 20s to 40s. Epidermal inclusion cysts by themselves are usually not inherited, but they can be hereditary in rare syndromes such as Beverly syndrome, nodular elastosis with cysts and comedones (Favre-Racouchot syndrome), and basal cell nevus syndrome (Gorlin syndrome). Elderly patients with chronic sun-damaged skin areas have a higher likelihood of developing epidermoid cysts. They often occur in areas where hair follicles have been inflamed or repeatedly irritated are more frequent in patients with acne vulgaris. In the  period, they are called milia.     Patients on BRAF inhibitors such as imiquimod and cyclosporine have a higher incidence of epidermoid cysts of the face.    How do we diagnose an epidermal inclusion cyst?  Epidermoid inclusion cysts are often diagnosed by history and physical exam. There is usually no need for biopsy prior to removal.  Radiographic and laboratory exams, such as ultrasound studies, are unnecessary and not typically ordered unless the practitioner suspects a genetic condition.    What is the treatment for an epidermal inclusion cyst?  Inflamed, uninfected epidermal inclusion cysts rarely resolve spontaneously without therapy or surgical intervention. Treatment is not emergent unless desired by the patient.     Definitive treatment is via surgical excision with walls intact. This method will prevent recurrence. This is best done when the cyst is not inflamed, to decrease the probability of rupture during surgery.   A local anesthetic will be injected around the cyst  A small incision is made in the skin overlying the cyst, and contents are expressed  The incision is repaired with sutures    Another option is to use a 4mm punch biopsy with cyst extraction through the defect.    Incision and drainage is often needed if the cyst is infected or inflamed. If there is surrounding cellulitis, oral antibiotic therapy may be necessary. The common  agents used target methicillin sensitive Staphylococcal aureus and methicillin resistant S aureus in areas of high prevalence.     KERATOSIS PILARIS    Physical Exam:  Anatomic Location Affected:  upper arms  Morphological Description:  1-2mm wilbert-follicular pinkish-red papules   Pertinent Positives:  Pertinent Negatives:    Assessment and Plan:  Based on a thorough discussion of this condition and the management approach to it (including a comprehensive discussion of the known risks, side effects and potential benefits of treatment), the patient (family) agrees to implement the following specific plan:  Reassured benign    Keratosis pilaris is a very common condition that appears as tiny bumps on the skin that may look like goosebumps or small pimples. These bumps are composed of small plugs of dead skin cells and are most commonly found over the upper arms and thighs. Children may also find bumps on their cheeks. Keratosis pilaris is harmless and affects up to half of normal children and up to three quarters of children who have ichthyosis vulgaris (a dry skin condition due to filaggrin gene mutations). It is also more common in children with atopic eczema.     Common symptoms of keratosis pilaris begin before age 2 or during the teenage years.    Bumps over the outer aspect of upper arms and thighs symmetrically that feel like sandpaper  Potentially over buttocks, sides of cheeks, forearms, and upper back  Scaly, skin colored or red spots in keratosis pilaris rubra  Non-painful, but potential to be itchy     Keratosis pilaris is caused by abnormal growth of skin cells lining the upper portion of the hair follicles. Instead of naturally sloughing off, scaly skin will pile up and fill the follicle. There is a strong association with genetics, meaning that the condition has a high chance of being inherited if one or both parents are affected. It can also occur as a side effect of cancer therapies such as  "vemurafenib.     Treating dry skin often helps as dry skin can cause the bumps to be more noticeable. Many people notice that the bumps disappear in the summer months when there is more moisture in the air. Sometimes, keratosis pilaris fades as one grows older, but puberty and hormonal therapy can cause flare-ups. If itch, dryness, or appearance bother you, treatment options include:  Using non-soap cleansers to minimize dryness of the skin   Exfoliation in the shower using a pumice stone or exfoliating sponge  Ammonium lactate cream or lotion (12%)   A moisturizing cream or ointment applied at least 2-3x a day and after bathing   Creams containing urea or lactic acid are especially helpful   Other medicines that remove dead skin cells can also be effective   Alpha hydroxyl acid  Glycolic acid  Retinoids (adapalene, retinol, tazarotene, trentinoin)  Salicylic acid  Pulse dye laser treatments or intense pulsed light can reduce redness temporarily, but not roughness   Laser assisted hair removal     XEROSIS (\"DRY SKIN\")    Physical Exam:  Anatomic Location Affected:  knees  Morphological Description:  manifesting thickening of knees  Pertinent Positives:  Pertinent Negatives:    Assessment and Plan:  Based on a thorough discussion of this condition and the management approach to it (including a comprehensive discussion of the known risks, side effects and potential benefits of treatment), the patient (family) agrees to implement the following specific plan:  Non drying bar soap; Cera Ve  Cera Ve moisturizer  Apply Am lactin on knees and elbows      Dry skin refers to skin that feels dry to touch. Dry skin has a dull surface with a rough, scaly quality. The skin is less pliable and cracked. When dryness is severe, the skin may become inflamed and fissured.  Although any body site can be dry, dry skin tends to affect the shins more than any other site.    Dry skin is lacking moisture in the outer horny cell layer " (stratum corneum) and this results in cracks in the skin surface.  Dry skin is also called xerosis, xeroderma or asteatosis (lack of fat).  It can affect males and females of all ages. There is some racial variability in water and lipid content of the skin.  Dry skin that starts in early childhood may be one of about 20 types of ichthyosis (fish-scale skin). There is often a family history of dry skin.   Dry skin is commonly seen in people with atopic dermatitis.  Nearly everyone > 60 years has dry skin.    Dry skin that begins later may be seen in people with certain diseases and conditions.  Postmenopausal women  Hypothyroidism  Chronic renal disease   Malnutrition and weight loss   Subclinical dermatitis   Treatment with certain drugs such as oral retinoids, diuretics and epidermal growth factor receptor inhibitors    People exposed to a dry environment may experience dry skin.  Low humidity: in desert climates or cool, windy conditions   Excessive air conditioning   Direct heat from a fire or fan heater   Excessive bathing   Contact with soap, detergents and solvents   Inappropriate topical agents such as alcohol   Frictional irritation from rough clothing or abrasives    Dry skin is due to abnormalities in the integrity of the barrier function of the stratum corneum, which is made up of corneocytes.  There is an overall reduction in the lipids in the stratum corneum.   Ratio of ceramides, cholesterol and free fatty acids may be normal or altered.   There may be a reduction in the proliferation of keratinocytes.   Keratinocyte subtypes change in dry skin with a decrease in keratins K1, K10 and increase in K5, K14.   Involucrin (a protein) may be expressed early, increasing cell stiffness.   The result is retention of corneocytes and reduced water-holding capacity.    The inherited forms of ichthyosis are due to loss of function mutations in various genes (listed in parentheses below).  The clinical features of  ichthyosis depend on the specific type of ichthyosis:  Ichthyosis vulgaris (FLG).   Recessive X-linked ichthyosis (STS)   Autosomal recessive congenital ichthyosis (ABCA12, TGM1, ALOXE3)   Keratinopathic ichthyoses (KRT1, KRT10, KRT2)    Acquired ichthyosis may be due to:  Metabolic factors: thyroid deficiency   Illness: lymphoma, internal malignancy, sarcoidosis, HIV infection   Drugs: nicotinic acid, kava, protein kinase inhibitors (eg EGFR inhibitors), hydroxyurea.    Complications of dry skin:  Dry areas of skin may become itchy, indicating a form of eczema/dermatitis has developed.  Atopic eczema -- especially in people with ichthyosis vulgaris   Eczema craquelé -- especially in elderly people. Also called asteatotic eczema   A dry form of nummular dermatitis/discoid eczema -- especially in people that wash their skin excessively.  When the dry skin of an elderly person is itchy without a visible rash, it is sometimes called winter itch, 7th age itch, senile pruritus or chronic pruritus of the elderly.    Other complications of dry skin may include:  Skin infection when bacteria or viruses penetrate a break in the skin surface   Overheating, especially in some forms of ichthyosis   Food allergy, eg, to peanuts, has been associated with filaggrin mutations   Contact allergy, eg, to nickel, has also been correlated with barrier function defects.    How is the type of dry skin diagnosed?  The type of dry skin is diagnosed by careful history and examination.    In children:  Family history   Age of onset   Appearance at birth, if known   Distribution of dry skin   Other features, eg eczema, abnormal nails, hair, dentition, sight, hearing.    In adults:  Medical history   Medications and topical preparations   Bathing frequency and use of soap   Evaluation of environmental factors that may contribute to dry skin.    What is the treatment for dry skin?  The mainstay of treatment of dry skin and ichthyosis is  moisturisers/emollients. They should be applied liberally and often enough to:  Reduce itch   Improve the barrier function   Prevent entry of irritants, bacteria   Reduce transepidermal water loss.    When considering which emollient is most suitable, consider:  Severity of the dryness   Tolerance   Personal preference   Cost and availability.    Emollients generally work best if applied to damp skin, if pH is below 7 (acidic), and if containing humectants such as urea or propylene glycol.  Additional treatments include:  Topical steroid if itchy or there is dermatitis -- choose an emollient base   Topical calcineurin inhibitors if topical steroids are unsuitable.    How can dry skin be prevented?  Eliminate aggravating factors:  Reduce the frequency of bathing.   A humidifier in winter and air conditioner in summer   Compare having a short shower with a prolonged soak in a bath.   Use lukewarm, not hot, water.   Replace standard soap with a substitute such as a synthetic detergent cleanser, water-miscible emollient, bath oil, anti-pruritic tar oil, colloidal oatmeal etc.   Apply an emollient liberally and often, particularly shortly after bathing, and when itchy. The drier the skin, the thicker this should be, especially on the hands.    What is the outlook for dry skin?  A tendency to dry skin may persist life-long, or it may improve once contributing factors are controlled.

## 2024-07-16 NOTE — PROGRESS NOTES
"[facility] Note     Patient Name: Robin Osborn Jr.  Encounter Date: 7/16/24     Have you been cared for by a [facility] Dermatologist in the last 3 years and, if so, which description applies to you?    NO.   I am considered a \"new\" patient and must complete all patient intake questions. I am MALE/not capable of bearing children.    REVIEW OF SYSTEMS:  Have you recently had or currently have any of the following? Recent fever or chills? No  Any non-healing wound? No   PAST MEDICAL HISTORY:  Have you personally ever had or currently have any of the following?  If \"YES,\" then please provide more detail. Skin cancer (such as Melanoma, Basal Cell Carcinoma, Squamous Cell Carcinoma?  No  Tuberculosis, HIV/AIDS, Hepatitis B or C: No  Radiation Treatment No   HISTORY OF IMMUNOSUPPRESSION:   Do you have a history of any of the following:  Systemic Immunosuppression such as Diabetes, Biologic or Immunotherapy, Chemotherapy, Organ Transplantation, Bone Marrow Transplantation?  No     Answering \"YES\" requires the addition of the dotphrase \"IMMUNOSUPPRESSED\" as the first diagnosis of the patient's visit.   FAMILY HISTORY:  Any \"first degree relatives\" (parent, brother, sister, or child) with the following?    Skin Cancer, Pancreatic or Other Cancer? YES, family hx of skin cancer in family   PATIENT EXPERIENCE:    Do you want the Dermatologist to perform a COMPLETE skin exam today including a clinical examination under the \"bra and underwear\" areas?  NO  If necessary, do we have your permission to call and leave a detailed message on your Preferred Phone number that includes your specific medical information?  Yes      No Known Allergies   Current Outpatient Medications:   •  albuterol (PROVENTIL HFA,VENTOLIN HFA) 90 mcg/act inhaler, Inhale 2 puffs every 4 (four) hours as needed (as needed), Disp: 1 Inhaler, Rfl: 3  •  escitalopram (LEXAPRO) 5 mg tablet, , Disp: , Rfl:   •  finasteride (PROPECIA) 1 MG tablet, " Take 1 tablet (1 mg total) by mouth daily, Disp: 90 tablet, Rfl: 2  •  multivitamin (THERAGRAN) TABS, Take 1 tablet by mouth daily, Disp: , Rfl:   •  pantoprazole (PROTONIX) 40 mg tablet, Take 1 tablet (40 mg total) by mouth daily, Disp: 90 tablet, Rfl: 1  •  ranitidine (ZANTAC) 150 mg tablet, Take 150 mg by mouth 2 (two) times a day, Disp: , Rfl:   •  sucralfate (CARAFATE) 1 g tablet, Take 1 tablet (1 g total) by mouth 4 (four) times a day, Disp: 90 tablet, Rfl: 0  •  cetirizine (ZyrTEC) 10 mg tablet, Take 10 mg by mouth daily (Patient not taking: Reported on 1/5/2024), Disp: , Rfl:   •  methocarbamol (ROBAXIN) 500 mg tablet, Take 1 tablet (500 mg total) by mouth 2 (two) times a day (Patient not taking: Reported on 5/20/2024), Disp: 20 tablet, Rfl: 0  •  naproxen (NAPROSYN) 375 mg tablet, Take 1 tablet (375 mg total) by mouth 2 (two) times a day with meals (Patient not taking: Reported on 5/20/2024), Disp: 20 tablet, Rfl: 0          Whom besides the patient is providing clinical information about today's encounter?   NO ADDITIONAL HISTORIAN (patient alone provided history)    Physical Exam and Assessment/Plan by Diagnosis:    EPIDERMAL INCLUSION CYST     Physical Exam:  Anatomic Location Affected:  right knee  Morphological Description:  cystic nodule  Pertinent Positives:  Pertinent Negatives:    Additional History of Present Condition:  patient notes that painful, had about 1  month    Assessment and Plan:  Based on a thorough discussion of this condition and the management approach to it (including a comprehensive discussion of the known risks, side effects and potential benefits of treatment), the patient (family) agrees to implement the following specific plan:    Open and drainage of cyst  Wound culture taken today      What are epidermal inclusion cysts?  Epidermal inclusion cysts are the most common, benign cutaneous cysts. There are many different names for epidermal inclusion cysts, including epidermoid  cyst, epidermal cyst, infundibular cyst, inclusion cyst, and keratin cyst. These cysts can occur anywhere on the body and typically present as nodules directly underneath the skin. There is often a visible pore or opening in the center. The cysts are freely moveable and can range from a few millimeters to several centimeters in diameter. The center of epidermoid cysts almost always contains keratin, which has a cheesy appearance, and not sebum. They also do not originate from sebaceous glands. Therefore, epidermal inclusion cysts are not the same as sebaceous cysts.    Cysts may remain stable or progressively enlarge over time. There are no reliable predictive factors to tell if an epidermal inclusion cyst will enlarge, become inflamed, or remain quiescent. Infected cysts tend to become larger, turn red, and are more noticeable to the patient. There may be accompanying pain and discomfort.     What causes epidermal inclusion cysts?  Epidermal inclusion cysts often appear out of the blue and are not contagious. They are due to a proliferation of epidermal cells within the dermis and are more common in men than women. They occur more frequently in patients in their 20s to 40s. Epidermal inclusion cysts by themselves are usually not inherited, but they can be hereditary in rare syndromes such as Beverly syndrome, nodular elastosis with cysts and comedones (Favre-Racouchot syndrome), and basal cell nevus syndrome (Gorlin syndrome). Elderly patients with chronic sun-damaged skin areas have a higher likelihood of developing epidermoid cysts. They often occur in areas where hair follicles have been inflamed or repeatedly irritated are more frequent in patients with acne vulgaris. In the  period, they are called milia.     Patients on BRAF inhibitors such as imiquimod and cyclosporine have a higher incidence of epidermoid cysts of the face.    How do we diagnose an epidermal inclusion cyst?  Epidermoid inclusion  cysts are often diagnosed by history and physical exam. There is usually no need for biopsy prior to removal.  Radiographic and laboratory exams, such as ultrasound studies, are unnecessary and not typically ordered unless the practitioner suspects a genetic condition.    What is the treatment for an epidermal inclusion cyst?  Inflamed, uninfected epidermal inclusion cysts rarely resolve spontaneously without therapy or surgical intervention. Treatment is not emergent unless desired by the patient.     Definitive treatment is via surgical excision with walls intact. This method will prevent recurrence. This is best done when the cyst is not inflamed, to decrease the probability of rupture during surgery.   A local anesthetic will be injected around the cyst  A small incision is made in the skin overlying the cyst, and contents are expressed  The incision is repaired with sutures    Another option is to use a 4mm punch biopsy with cyst extraction through the defect.    Incision and drainage is often needed if the cyst is infected or inflamed. If there is surrounding cellulitis, oral antibiotic therapy may be necessary. The common agents used target methicillin sensitive Staphylococcal aureus and methicillin resistant S aureus in areas of high prevalence.     KERATOSIS PILARIS    Physical Exam:  Anatomic Location Affected:  upper arms  Morphological Description:  1-2mm wilbert-follicular pinkish-red papules   Pertinent Positives:  Pertinent Negatives:    Assessment and Plan:  Based on a thorough discussion of this condition and the management approach to it (including a comprehensive discussion of the known risks, side effects and potential benefits of treatment), the patient (family) agrees to implement the following specific plan:  Reassured benign    Keratosis pilaris is a very common condition that appears as tiny bumps on the skin that may look like goosebumps or small pimples. These bumps are composed of small  plugs of dead skin cells and are most commonly found over the upper arms and thighs. Children may also find bumps on their cheeks. Keratosis pilaris is harmless and affects up to half of normal children and up to three quarters of children who have ichthyosis vulgaris (a dry skin condition due to filaggrin gene mutations). It is also more common in children with atopic eczema.     Common symptoms of keratosis pilaris begin before age 2 or during the teenage years.    Bumps over the outer aspect of upper arms and thighs symmetrically that feel like sandpaper  Potentially over buttocks, sides of cheeks, forearms, and upper back  Scaly, skin colored or red spots in keratosis pilaris rubra  Non-painful, but potential to be itchy     Keratosis pilaris is caused by abnormal growth of skin cells lining the upper portion of the hair follicles. Instead of naturally sloughing off, scaly skin will pile up and fill the follicle. There is a strong association with genetics, meaning that the condition has a high chance of being inherited if one or both parents are affected. It can also occur as a side effect of cancer therapies such as vemurafenib.     Treating dry skin often helps as dry skin can cause the bumps to be more noticeable. Many people notice that the bumps disappear in the summer months when there is more moisture in the air. Sometimes, keratosis pilaris fades as one grows older, but puberty and hormonal therapy can cause flare-ups. If itch, dryness, or appearance bother you, treatment options include:  Using non-soap cleansers to minimize dryness of the skin   Exfoliation in the shower using a pumice stone or exfoliating sponge  Ammonium lactate cream or lotion (12%)   A moisturizing cream or ointment applied at least 2-3x a day and after bathing   Creams containing urea or lactic acid are especially helpful   Other medicines that remove dead skin cells can also be effective   Alpha hydroxyl acid  Glycolic  "acid  Retinoids (adapalene, retinol, tazarotene, trentinoin)  Salicylic acid  Pulse dye laser treatments or intense pulsed light can reduce redness temporarily, but not roughness   Laser assisted hair removal     XEROSIS (\"DRY SKIN\")    Physical Exam:  Anatomic Location Affected:  knees  Morphological Description:  manifesting thickening of knees  Pertinent Positives:  Pertinent Negatives:    Assessment and Plan:  Based on a thorough discussion of this condition and the management approach to it (including a comprehensive discussion of the known risks, side effects and potential benefits of treatment), the patient (family) agrees to implement the following specific plan:  Non drying bar soap; Cera Ve  Cera Ve moisturizer  Apply Am lactin on knees and elbows      Dry skin refers to skin that feels dry to touch. Dry skin has a dull surface with a rough, scaly quality. The skin is less pliable and cracked. When dryness is severe, the skin may become inflamed and fissured.  Although any body site can be dry, dry skin tends to affect the shins more than any other site.    Dry skin is lacking moisture in the outer horny cell layer (stratum corneum) and this results in cracks in the skin surface.  Dry skin is also called xerosis, xeroderma or asteatosis (lack of fat).  It can affect males and females of all ages. There is some racial variability in water and lipid content of the skin.  Dry skin that starts in early childhood may be one of about 20 types of ichthyosis (fish-scale skin). There is often a family history of dry skin.   Dry skin is commonly seen in people with atopic dermatitis.  Nearly everyone > 60 years has dry skin.    Dry skin that begins later may be seen in people with certain diseases and conditions.  Postmenopausal women  Hypothyroidism  Chronic renal disease   Malnutrition and weight loss   Subclinical dermatitis   Treatment with certain drugs such as oral retinoids, diuretics and epidermal growth " factor receptor inhibitors    People exposed to a dry environment may experience dry skin.  Low humidity: in desert climates or cool, windy conditions   Excessive air conditioning   Direct heat from a fire or fan heater   Excessive bathing   Contact with soap, detergents and solvents   Inappropriate topical agents such as alcohol   Frictional irritation from rough clothing or abrasives    Dry skin is due to abnormalities in the integrity of the barrier function of the stratum corneum, which is made up of corneocytes.  There is an overall reduction in the lipids in the stratum corneum.   Ratio of ceramides, cholesterol and free fatty acids may be normal or altered.   There may be a reduction in the proliferation of keratinocytes.   Keratinocyte subtypes change in dry skin with a decrease in keratins K1, K10 and increase in K5, K14.   Involucrin (a protein) may be expressed early, increasing cell stiffness.   The result is retention of corneocytes and reduced water-holding capacity.    The inherited forms of ichthyosis are due to loss of function mutations in various genes (listed in parentheses below).  The clinical features of ichthyosis depend on the specific type of ichthyosis:  Ichthyosis vulgaris (FLG).   Recessive X-linked ichthyosis (STS)   Autosomal recessive congenital ichthyosis (ABCA12, TGM1, ALOXE3)   Keratinopathic ichthyoses (KRT1, KRT10, KRT2)    Acquired ichthyosis may be due to:  Metabolic factors: thyroid deficiency   Illness: lymphoma, internal malignancy, sarcoidosis, HIV infection   Drugs: nicotinic acid, kava, protein kinase inhibitors (eg EGFR inhibitors), hydroxyurea.    Complications of dry skin:  Dry areas of skin may become itchy, indicating a form of eczema/dermatitis has developed.  Atopic eczema -- especially in people with ichthyosis vulgaris   Eczema craquelé -- especially in elderly people. Also called asteatotic eczema   A dry form of nummular dermatitis/discoid eczema -- especially  in people that wash their skin excessively.  When the dry skin of an elderly person is itchy without a visible rash, it is sometimes called winter itch, 7th age itch, senile pruritus or chronic pruritus of the elderly.    Other complications of dry skin may include:  Skin infection when bacteria or viruses penetrate a break in the skin surface   Overheating, especially in some forms of ichthyosis   Food allergy, eg, to peanuts, has been associated with filaggrin mutations   Contact allergy, eg, to nickel, has also been correlated with barrier function defects.    How is the type of dry skin diagnosed?  The type of dry skin is diagnosed by careful history and examination.    In children:  Family history   Age of onset   Appearance at birth, if known   Distribution of dry skin   Other features, eg eczema, abnormal nails, hair, dentition, sight, hearing.    In adults:  Medical history   Medications and topical preparations   Bathing frequency and use of soap   Evaluation of environmental factors that may contribute to dry skin.    What is the treatment for dry skin?  The mainstay of treatment of dry skin and ichthyosis is moisturisers/emollients. They should be applied liberally and often enough to:  Reduce itch   Improve the barrier function   Prevent entry of irritants, bacteria   Reduce transepidermal water loss.    When considering which emollient is most suitable, consider:  Severity of the dryness   Tolerance   Personal preference   Cost and availability.    Emollients generally work best if applied to damp skin, if pH is below 7 (acidic), and if containing humectants such as urea or propylene glycol.  Additional treatments include:  Topical steroid if itchy or there is dermatitis -- choose an emollient base   Topical calcineurin inhibitors if topical steroids are unsuitable.    How can dry skin be prevented?  Eliminate aggravating factors:  Reduce the frequency of bathing.   A humidifier in winter and air  conditioner in summer   Compare having a short shower with a prolonged soak in a bath.   Use lukewarm, not hot, water.   Replace standard soap with a substitute such as a synthetic detergent cleanser, water-miscible emollient, bath oil, anti-pruritic tar oil, colloidal oatmeal etc.   Apply an emollient liberally and often, particularly shortly after bathing, and when itchy. The drier the skin, the thicker this should be, especially on the hands.    What is the outlook for dry skin?  A tendency to dry skin may persist life-long, or it may improve once contributing factors are controlled.    Scribe Attestation    I,:  Leanne Marie am acting as a scribe while in the presence of the attending physician.:       I,:  Wilber Barnes MD personally performed the services described in this documentation    as scribed in my presence.:

## 2024-07-17 ENCOUNTER — TELEPHONE (OUTPATIENT)
Dept: PAIN MEDICINE | Facility: CLINIC | Age: 39
End: 2024-07-17

## 2024-07-17 ENCOUNTER — HOSPITAL ENCOUNTER (OUTPATIENT)
Dept: RADIOLOGY | Facility: CLINIC | Age: 39
Discharge: HOME | End: 2024-07-17
Admitting: ANESTHESIOLOGY
Payer: COMMERCIAL

## 2024-07-17 VITALS
RESPIRATION RATE: 17 BRPM | SYSTOLIC BLOOD PRESSURE: 123 MMHG | DIASTOLIC BLOOD PRESSURE: 76 MMHG | OXYGEN SATURATION: 98 % | HEART RATE: 74 BPM | TEMPERATURE: 96.3 F

## 2024-07-17 DIAGNOSIS — M47.816 LUMBAR SPONDYLOSIS: ICD-10-CM

## 2024-07-17 PROCEDURE — 64636 DESTROY L/S FACET JNT ADDL: CPT | Performed by: ANESTHESIOLOGY

## 2024-07-17 PROCEDURE — 64635 DESTROY LUMB/SAC FACET JNT: CPT | Performed by: ANESTHESIOLOGY

## 2024-07-17 RX ORDER — LIDOCAINE HYDROCHLORIDE 10 MG/ML
30 INJECTION, SOLUTION EPIDURAL; INFILTRATION; INTRACAUDAL; PERINEURAL ONCE
Status: COMPLETED | OUTPATIENT
Start: 2024-07-17 | End: 2024-07-17

## 2024-07-17 RX ORDER — BUPIVACAINE HCL/PF 2.5 MG/ML
10 VIAL (ML) INJECTION ONCE
Status: COMPLETED | OUTPATIENT
Start: 2024-07-17 | End: 2024-07-17

## 2024-07-17 RX ORDER — METHYLPREDNISOLONE ACETATE 80 MG/ML
80 INJECTION, SUSPENSION INTRA-ARTICULAR; INTRALESIONAL; INTRAMUSCULAR; PARENTERAL; SOFT TISSUE ONCE
Status: COMPLETED | OUTPATIENT
Start: 2024-07-17 | End: 2024-07-17

## 2024-07-17 RX ADMIN — LIDOCAINE HYDROCHLORIDE 16 ML: 10 INJECTION, SOLUTION EPIDURAL; INFILTRATION; INTRACAUDAL; PERINEURAL at 11:30

## 2024-07-17 RX ADMIN — BUPIVACAINE HYDROCHLORIDE 3 ML: 2.5 INJECTION, SOLUTION EPIDURAL; INFILTRATION; INTRACAUDAL at 11:42

## 2024-07-17 RX ADMIN — METHYLPREDNISOLONE ACETATE 20 MG: 80 INJECTION, SUSPENSION INTRA-ARTICULAR; INTRALESIONAL; INTRAMUSCULAR; PARENTERAL; SOFT TISSUE at 11:42

## 2024-07-17 NOTE — H&P
History of Present Illness: The patient is a 38 y.o. male who presents with complaints of right lower back pain is here today for right L3-5 ablation    Past Medical History:   Diagnosis Date    Asthma     Esophageal reflux     GERD (gastroesophageal reflux disease)     HL (hearing loss)     Migraine     Tinnitus        Past Surgical History:   Procedure Laterality Date    CYST REMOVAL Right     index finger    WISDOM TOOTH EXTRACTION           Current Outpatient Medications:     albuterol (PROVENTIL HFA,VENTOLIN HFA) 90 mcg/act inhaler, Inhale 2 puffs every 4 (four) hours as needed (as needed), Disp: 1 Inhaler, Rfl: 3    cetirizine (ZyrTEC) 10 mg tablet, Take 10 mg by mouth daily (Patient not taking: Reported on 1/5/2024), Disp: , Rfl:     escitalopram (LEXAPRO) 5 mg tablet, , Disp: , Rfl:     finasteride (PROPECIA) 1 MG tablet, Take 1 tablet (1 mg total) by mouth daily, Disp: 90 tablet, Rfl: 2    methocarbamol (ROBAXIN) 500 mg tablet, Take 1 tablet (500 mg total) by mouth 2 (two) times a day (Patient not taking: Reported on 5/20/2024), Disp: 20 tablet, Rfl: 0    multivitamin (THERAGRAN) TABS, Take 1 tablet by mouth daily, Disp: , Rfl:     naproxen (NAPROSYN) 375 mg tablet, Take 1 tablet (375 mg total) by mouth 2 (two) times a day with meals (Patient not taking: Reported on 5/20/2024), Disp: 20 tablet, Rfl: 0    pantoprazole (PROTONIX) 40 mg tablet, Take 1 tablet (40 mg total) by mouth daily, Disp: 90 tablet, Rfl: 1    ranitidine (ZANTAC) 150 mg tablet, Take 150 mg by mouth 2 (two) times a day, Disp: , Rfl:     sucralfate (CARAFATE) 1 g tablet, Take 1 tablet (1 g total) by mouth 4 (four) times a day, Disp: 90 tablet, Rfl: 0    Current Facility-Administered Medications:     bupivacaine (PF) (MARCAINE) 0.25 % injection 10 mL, 10 mL, Perineural, Once, John Paul Motta MD    lidocaine (PF) (XYLOCAINE-MPF) 1 % injection 30 mL, 30 mL, Infiltration, Once, John Paul Motta MD    methylPREDNISolone acetate (DEPO-MEDROL)  injection 80 mg, 80 mg, Perineural, Once, John Paul Motta MD    No Known Allergies    Physical Exam:   Vitals:    07/17/24 1117   BP: 108/69   Pulse: 68   Resp: 17   Temp: (!) 96.3 °F (35.7 °C)   SpO2: 98%     General: Awake, Alert, Oriented x 3, Mood and affect appropriate  Respiratory: Respirations even and unlabored  Cardiovascular: Peripheral pulses intact; no edema  Musculoskeletal Exam: Right lower back pain    ASA Score: 1    Patient/Chart Verification  Patient ID Verified: Verbal  ID Band Applied: No  Consents Confirmed: Procedural  H&P( within 30 days) Verified: To be obtained in the Pre-Procedure area  Allergies Reviewed: Yes  Anticoag/NSAID held?: NA  Currently on antibiotics?: No    Assessment:   1. Lumbar spondylosis        Plan: Right L3-5 MB RFA

## 2024-07-17 NOTE — DISCHARGE INSTR - LAB

## 2024-07-19 LAB
BACTERIA WND AEROBE CULT: ABNORMAL
GRAM STN SPEC: ABNORMAL

## 2024-07-19 NOTE — TELEPHONE ENCOUNTER
Pt reports his pain level today is 2/10. Yest he only had some tenderness but it did not require any pain meds or ice packs. Inj sites look fine with no s/s of infection. Pt informed it takes 6 wks to see full benefit from the ablation.  Pt given post RFA ovs w/ FQ for 8/26/24.

## 2024-07-19 NOTE — TELEPHONE ENCOUNTER
Caller: alfie Kelly     Doctor: Ángela    Reason for call: pt returning nurses call    Call back#: [phone]

## 2024-08-26 ENCOUNTER — OFFICE VISIT (OUTPATIENT)
Dept: PAIN MEDICINE | Facility: CLINIC | Age: 39
End: 2024-08-26
Payer: COMMERCIAL

## 2024-08-26 VITALS — RESPIRATION RATE: 16 BRPM | HEART RATE: 76 BPM | SYSTOLIC BLOOD PRESSURE: 124 MMHG | DIASTOLIC BLOOD PRESSURE: 82 MMHG

## 2024-08-26 DIAGNOSIS — M79.18 MYOFASCIAL PAIN SYNDROME: ICD-10-CM

## 2024-08-26 DIAGNOSIS — M47.816 LUMBAR SPONDYLOSIS: Primary | ICD-10-CM

## 2024-08-26 PROCEDURE — 99213 OFFICE O/P EST LOW 20 MIN: CPT | Performed by: ANESTHESIOLOGY

## 2024-08-26 RX ORDER — METHOCARBAMOL 750 MG/1
750 TABLET, FILM COATED ORAL 2 TIMES DAILY PRN
Qty: 60 TABLET | Refills: 1 | Status: SHIPPED | OUTPATIENT
Start: 2024-08-26

## 2024-08-26 NOTE — PROGRESS NOTES
Assessment:  1. Lumbar spondylosis    2. Myofascial pain syndrome        Plan:  The patient has taken improvement following the radiofrequency ablation.  At this time, he may resume his normal exercise routine and was advised should symptoms worsen or recur he is to contact our office.    For now, prescription of methocarbamol 750 mg was given for days he is having increased spasm.    My impressions and treatment recommendations were discussed in detail with the patient who verbalized understanding and had no further questions.  Discharge instructions were provided. I personally saw and examined the patient and I agree with the above discussed plan of care.    No orders of the defined types were placed in this encounter.    New Medications Ordered This Visit   Medications   • methocarbamol (ROBAXIN) 750 mg tablet     Sig: Take 1 tablet (750 mg total) by mouth 2 (two) times a day as needed for muscle spasms     Dispense:  60 tablet     Refill:  1       History of Present Illness:  Robin Osborn Jr. is a 38 y.o. male who presents for a follow up office visit in regards to Back Pain.   The patient is status post right L3-5 medial branch radiofrequency ablation on 7/17/2020 for reports significant relief which is ongoing.  He states more than 80% relief and is able to resume his normal exercise routine.  He reports that he does get intermittent spasms for which he would like a muscle relaxant.    I have personally reviewed and/or updated the patient's past medical history, past surgical history, family history, social history, current medications, allergies, and vital signs today.     Review of Systems   Respiratory:  Negative for shortness of breath.    Cardiovascular:  Negative for chest pain.   Gastrointestinal:  Negative for constipation, diarrhea, nausea and vomiting.   Musculoskeletal:  Positive for back pain. Negative for arthralgias, gait problem, joint swelling and myalgias.   Skin:  Negative for rash.    Neurological:  Negative for dizziness, seizures and weakness.   All other systems reviewed and are negative.      Patient Active Problem List   Diagnosis   • Chronic low back pain   • Disc degeneration, lumbar   • Lumbar disc herniation   • Alopecia   • Intervertebral disc disorder with radiculopathy of lumbar region   • Patellofemoral dysfunction of left knee   • Internal derangement of left knee   • Anxiety   • Other headache syndrome   • Lumbar spondylosis   • Folliculitis   • Need for influenza vaccination   • Esophageal reflux   • Diarrhea   • Dermatitis       Past Medical History:   Diagnosis Date   • Asthma    • Esophageal reflux    • GERD (gastroesophageal reflux disease)    • HL (hearing loss)    • Migraine    • Tinnitus        Past Surgical History:   Procedure Laterality Date   • CYST REMOVAL Right     index finger   • WISDOM TOOTH EXTRACTION         Family History   Problem Relation Age of Onset   • No Known Problems Mother    • Hyperlipidemia Father    • Hypertension Father    • Colon cancer Maternal Aunt    • Skin cancer Maternal Grandmother    • Cancer Maternal Grandfather    • Skin cancer Maternal Grandfather    • Hypertension Paternal Grandmother    • Hypertension Paternal Grandfather    • Skin cancer Paternal Grandfather        Social History     Occupational History   • Not on file   Tobacco Use   • Smoking status: Never   • Smokeless tobacco: Never   Vaping Use   • Vaping status: Never Used   Substance and Sexual Activity   • Alcohol use: Yes     Comment: social   • Drug use: No   • Sexual activity: Yes       Current Outpatient Medications on File Prior to Visit   Medication Sig   • albuterol (PROVENTIL HFA,VENTOLIN HFA) 90 mcg/act inhaler Inhale 2 puffs every 4 (four) hours as needed (as needed)   • escitalopram (LEXAPRO) 5 mg tablet    • finasteride (PROPECIA) 1 MG tablet Take 1 tablet (1 mg total) by mouth daily   • multivitamin (THERAGRAN) TABS Take 1 tablet by mouth daily   • pantoprazole  (PROTONIX) 40 mg tablet Take 1 tablet (40 mg total) by mouth daily   • ranitidine (ZANTAC) 150 mg tablet Take 150 mg by mouth 2 (two) times a day   • sucralfate (CARAFATE) 1 g tablet Take 1 tablet (1 g total) by mouth 4 (four) times a day   • cetirizine (ZyrTEC) 10 mg tablet Take 10 mg by mouth daily (Patient not taking: Reported on 1/5/2024)   • naproxen (NAPROSYN) 375 mg tablet Take 1 tablet (375 mg total) by mouth 2 (two) times a day with meals (Patient not taking: Reported on 5/20/2024)     No current facility-administered medications on file prior to visit.       No Known Allergies    Physical Exam:    /82   Pulse 76   Resp 16     Constitutional:normal, well developed, well nourished, alert, in no distress and non-toxic and no overt pain behavior.  Eyes:anicteric  HEENT:grossly intact  Neck:supple, symmetric, trachea midline and no masses   Pulmonary:even and unlabored  Cardiovascular:No edema or pitting edema present  Skin:Normal without rashes or lesions and well hydrated  Psychiatric:Mood and affect appropriate  Neurologic:Cranial Nerves II-XII grossly intact  Musculoskeletal:normal    Lumbar Spine Exam  Appearance:  Normal lordosis  Palpation/Tenderness:  no tenderness or spasm  Motor Strength:  Left hip flexion:  5/5  Left hip extension:  5/5  Right hip flexion:  5/5  Right hip extension:  5/5  Left knee flexion:  5/5  Left knee extension:  5/5  Right knee flexion:  5/5  Right knee extension:  5/5  Left foot dorsiflexion:  5/5  Left foot plantar flexion:  5/5  Right foot dorsiflexion:  5/5  Right foot plantar flexion:  5/5    Imaging    MRI LUMBAR SPINE WITHOUT CONTRAST (10/16/2021)     INDICATION: M51.16: Intervertebral disc disorders with radiculopathy, lumbar region.     COMPARISON:  3/15/2018     TECHNIQUE:  Sagittal T1, sagittal T2, sagittal inversion recovery, axial T1 and axial T2, coronal T2.    IMAGE QUALITY:  Diagnostic     FINDINGS:     VERTEBRAL BODIES:  There are 5 lumbar type  vertebral bodies.  Normal alignment of the lumbar spine.  No spondylolysis or spondylolisthesis. No scoliosis.  No compression fracture.    Normal marrow signal is identified within the visualized bony   structures.  No discrete marrow lesion.     SACRUM:  Normal signal within the sacrum. No evidence of insufficiency or stress fracture.     DISTAL CORD AND CONUS:  Normal size and signal within the distal cord and conus.     PARASPINAL SOFT TISSUES:  Paraspinal soft tissues are unremarkable.     LOWER THORACIC DISC SPACES:  Normal disc height and signal.  No disc herniation, canal stenosis or foraminal narrowing.     LUMBAR DISC SPACES:     L1-L2:  Normal.     L2-L3:  Mild bulge with superimposed left foraminal disc herniation, extrusion type exhibiting both caudal and cranial migration and resulting in mild to moderate left foraminal stenosis.  The disc herniation abuts the left L2 nerve root best seen on   series 7 image 14.  Correlate clinically for radicular symptoms.  Central canal and right neural foramen both remain patent.  Findings are stable.     L3-L4:  Minor bulge with superimposed right foraminal disc protrusion best seen on series 7 image 23.  Facet arthrosis evident.  There is mild to moderate right foraminal narrowing.  Correlate clinically for right L3 radiculopathy.  Central canal and   left neural foramen both remain patent.  Findings are stable.     L4-L5:  Disc desiccation with moderate loss of disc height.  Diffuse disc bulge with superimposed left paracentral disc extrusion exhibiting mild cranial migration.  There is prominent narrowing of the left subarticular recess concerning for impingement   of the descending left L5 nerve root.  Mild to moderate left central canal stenosis.  There is mild to moderate bilateral foraminal narrowing evident.  Findings are stable.     L5-S1:  Normal.

## 2024-12-05 DIAGNOSIS — K21.9 GASTROESOPHAGEAL REFLUX DISEASE WITHOUT ESOPHAGITIS: ICD-10-CM

## 2024-12-05 RX ORDER — PANTOPRAZOLE SODIUM 40 MG/1
40 TABLET, DELAYED RELEASE ORAL DAILY
Qty: 90 TABLET | Refills: 3 | Status: SHIPPED | OUTPATIENT
Start: 2024-12-05

## 2025-01-05 ENCOUNTER — DOCUMENTATION (OUTPATIENT)
Dept: SURGERY | Facility: CLINIC | Age: 40
End: 2025-01-05

## 2025-01-05 DIAGNOSIS — S05.00XA CORNEAL ABRASION: Primary | ICD-10-CM

## 2025-02-24 ENCOUNTER — TELEPHONE (OUTPATIENT)
Dept: PAIN MEDICINE | Facility: CLINIC | Age: 40
End: 2025-02-24

## 2025-02-24 NOTE — TELEPHONE ENCOUNTER
Left message for patient to contact the scheduling office at [phone] to schedule their procedure.

## 2025-02-24 NOTE — TELEPHONE ENCOUNTER
Received message from patient that he was experiencing worsening pain in the left lower back with spasms similar to what he had prior to radiofrequency ablation on left side which was performed on 4/5/2022.  Patient would be amenable to repeating Ex[facility] please have him scheduled for left L3-5 medial branch radiofrequency ablation.

## 2025-03-10 ENCOUNTER — OFFICE VISIT (OUTPATIENT)
Dept: FAMILY MEDICINE CLINIC | Facility: CLINIC | Age: 40
End: 2025-03-10
Payer: COMMERCIAL

## 2025-03-10 VITALS
DIASTOLIC BLOOD PRESSURE: 64 MMHG | RESPIRATION RATE: 18 BRPM | SYSTOLIC BLOOD PRESSURE: 100 MMHG | WEIGHT: 190 LBS | TEMPERATURE: 98.2 F | HEART RATE: 74 BPM | BODY MASS INDEX: 28.14 KG/M2 | HEIGHT: 69 IN | OXYGEN SATURATION: 98 %

## 2025-03-10 DIAGNOSIS — R53.82 CHRONIC FATIGUE: ICD-10-CM

## 2025-03-10 DIAGNOSIS — Z00.00 ANNUAL PHYSICAL EXAM: Primary | ICD-10-CM

## 2025-03-10 PROCEDURE — 99396 PREV VISIT EST AGE 40-64: CPT | Performed by: FAMILY MEDICINE

## 2025-03-10 RX ORDER — BUPROPION HCL 150 MG
150 TABLET, EXTENDED RELEASE 24 HR ORAL DAILY
COMMUNITY
Start: 2024-11-16

## 2025-03-10 NOTE — PROGRESS NOTES
FAMILY PRACTICE OFFICE VISIT       NAME: Robin Osborn Jr.  AGE: 39 y.o. SEX: male       : 1985        MRN: [id number]    DATE: 3/10/2025  TIME: 4:14 PM    Assessment and Plan     Problem List Items Addressed This Visit       Annual physical exam - Primary    Well adult.  Overall the patient appears to be in stable health.  He will obtain blood work as ordered for further evaluation.  We will make further recommendations pending results of test.         Relevant Orders    CBC    Comprehensive metabolic panel    TSH, 3rd generation    Lipid panel    Chronic fatigue    Relevant Orders    CBC    Comprehensive metabolic panel    TSH, 3rd generation    Lipid panel    Testosterone, Free+Total LC/MS           Chief Complaint     Chief Complaint   Patient presents with    Well Check     Physical        History of Present Illness     Patient in the office for annual wellness exam.  He denies any recent illness.  He lives with his wife and his 2 children ages 4 and 2.  This past year he did see psychiatry for his feelings of anxiety, OCD, and some depression symptoms.  He states he has been feeling better on his combination of Wellbutrin and Lexapro.  He tries to exercise 4 to 5 days a week but feels chronic fatigue.  He is planning to have a follow-up ablation therapy of his low back by his pain management physician.        Review of Systems   Review of Systems   Constitutional:  Positive for fatigue.   HENT: Negative.     Eyes: Negative.    Respiratory: Negative.     Cardiovascular: Negative.    Gastrointestinal: Negative.    Genitourinary: Negative.    Musculoskeletal:  Positive for arthralgias and back pain.   Skin: Negative.    Neurological: Negative.    Psychiatric/Behavioral: Negative.         Active Problem List     Patient Active Problem List   Diagnosis    Chronic low back pain    Disc degeneration, lumbar    Lumbar disc herniation    Alopecia    Intervertebral disc disorder with radiculopathy of lumbar  region    Patellofemoral dysfunction of left knee    Internal derangement of left knee    Anxiety    Other headache syndrome    Lumbar spondylosis    Folliculitis    Need for influenza vaccination    Esophageal reflux    Diarrhea    Dermatitis    Annual physical exam    Chronic fatigue       Past Medical History:  Past Medical History:   Diagnosis Date    Asthma     Esophageal reflux     GERD (gastroesophageal reflux disease)     HL (hearing loss)     Migraine     Tinnitus        Past Surgical History:  Past Surgical History:   Procedure Laterality Date    CYST REMOVAL Right     index finger    WISDOM TOOTH EXTRACTION         Family History:  Family History   Problem Relation Age of Onset    No Known Problems Mother     Hyperlipidemia Father     Hypertension Father     Colon cancer Maternal Aunt     Skin cancer Maternal Grandmother     Cancer Maternal Grandfather     Skin cancer Maternal Grandfather     Hypertension Paternal Grandmother     Hypertension Paternal Grandfather     Skin cancer Paternal Grandfather        Social History:  Social History     Socioeconomic History    Marital status: /Civil Union     Spouse name: Not on file    Number of children: Not on file    Years of education: Not on file    Highest education level: Not on file   Occupational History    Not on file   Tobacco Use    Smoking status: Never    Smokeless tobacco: Never   Vaping Use    Vaping status: Never Used   Substance and Sexual Activity    Alcohol use: Yes     Comment: social    Drug use: No    Sexual activity: Yes   Other Topics Concern    Not on file   Social History Narrative    Not on file     Social Drivers of Health     Financial Resource Strain: Not on file   Food Insecurity: Not on file   Transportation Needs: Not on file   Physical Activity: Not on file   Stress: Not on file   Social Connections: Not on file   Intimate Partner Violence: Not on file   Housing Stability: Not on file       Objective     Vitals:    03/10/25  1500   BP: 100/64   Pulse: 74   Resp: 18   Temp: 98.2 °F (36.8 °C)   SpO2: 98%     Wt Readings from Last 3 Encounters:   03/10/25 86.2 kg (190 lb)   07/16/24 83.9 kg (185 lb)   05/20/24 86 kg (189 lb 8 oz)       Physical Exam  Constitutional:       General: He is not in acute distress.     Appearance: Normal appearance. He is not ill-appearing.   HENT:      Head: Normocephalic and atraumatic.      Right Ear: Tympanic membrane, ear canal and external ear normal. There is no impacted cerumen.      Left Ear: Tympanic membrane, ear canal and external ear normal. There is no impacted cerumen.   Eyes:      General:         Right eye: No discharge.         Left eye: No discharge.      Extraocular Movements: Extraocular movements intact.      Conjunctiva/sclera: Conjunctivae normal.      Pupils: Pupils are equal, round, and reactive to light.   Neck:      Vascular: No carotid bruit.   Cardiovascular:      Rate and Rhythm: Normal rate and regular rhythm.      Heart sounds: Normal heart sounds. No murmur heard.  Pulmonary:      Effort: Pulmonary effort is normal.      Breath sounds: Normal breath sounds. No wheezing, rhonchi or rales.   Abdominal:      General: Abdomen is flat. Bowel sounds are normal. There is no distension.      Palpations: Abdomen is soft.      Tenderness: There is no abdominal tenderness. There is no guarding or rebound.   Musculoskeletal:      Right lower leg: No edema.      Left lower leg: No edema.   Lymphadenopathy:      Cervical: No cervical adenopathy.   Skin:     Findings: No rash.   Neurological:      General: No focal deficit present.      Mental Status: He is alert and oriented to person, place, and time.      Cranial Nerves: No cranial nerve deficit.   Psychiatric:         Mood and Affect: Mood normal.         Behavior: Behavior normal.         Thought Content: Thought content normal.         Judgment: Judgment normal.         Pertinent Laboratory/Diagnostic Studies:  Lab Results   Component  "Value Date    GLUCOSE 87 07/01/2015    BUN 20 01/16/2023    CREATININE 1.11 01/16/2023    CALCIUM 8.9 01/16/2023     07/01/2015    K 3.9 01/16/2023    CO2 29 01/16/2023     01/16/2023     Lab Results   Component Value Date    ALT 26 01/16/2023    AST 18 01/16/2023    ALKPHOS 72 01/16/2023    BILITOT 0.63 07/01/2015       Lab Results   Component Value Date    WBC 6.80 01/16/2023    HGB 14.9 01/16/2023    HCT 41.6 01/16/2023    MCV 88 01/16/2023     01/16/2023       No results found for: \"TSH\"    Lab Results   Component Value Date    CHOL 120 07/01/2015     Lab Results   Component Value Date    TRIG 176 (H) 01/16/2023     Lab Results   Component Value Date    HDL 39 (L) 01/16/2023     Lab Results   Component Value Date    LDLCALC 79 01/16/2023     No results found for: \"HGBA1C\"    Results for orders placed or performed in visit on 07/16/24   Wound culture and Gram stain    Specimen: Leg, Left; Wound   Result Value Ref Range    Wound Culture 1+ Growth of Staphylococcus lugdunensis (A)     Gram Stain Result No Polys or Bacteria seen        Susceptibility    Staphylococcus lugdunensis - JANNETH     ZID Performed Yes       Cefazolin ($) <=8.00 Susceptible ug/ml     Clindamycin ($) <=0.25 Susceptible ug/ml     Erythromycin ($$$$) <=0.25 Susceptible ug/ml     Gentamicin ($$) <=4 Susceptible ug/ml     Oxacillin 0.50 Susceptible ug/ml     Penicillin >2.000 Resistant ug/ml     Tetracycline >8 Resistant ug/ml     Trimethoprim + Sulfamethoxazole ($$$) <=0.5/9.5 Susceptible ug/ml     Vancomycin ($) 1.00 Susceptible ug/ml       Orders Placed This Encounter   Procedures    CBC    Comprehensive metabolic panel    TSH, 3rd generation    Lipid panel    Testosterone, Free+Total LC/MS       ALLERGIES:  No Known Allergies    Current Medications     Current Outpatient Medications   Medication Sig Dispense Refill    albuterol (PROVENTIL HFA,VENTOLIN HFA) 90 mcg/act inhaler Inhale 2 puffs every 4 (four) hours as needed (as " needed) 1 Inhaler 3    escitalopram (LEXAPRO) 5 mg tablet       finasteride (PROPECIA) 1 MG tablet Take 1 tablet (1 mg total) by mouth daily 90 tablet 2    methocarbamol (ROBAXIN) 750 mg tablet Take 1 tablet (750 mg total) by mouth 2 (two) times a day as needed for muscle spasms 60 tablet 1    multivitamin (THERAGRAN) TABS Take 1 tablet by mouth daily      pantoprazole (PROTONIX) 40 mg tablet Take 1 tablet (40 mg total) by mouth daily 90 tablet 3    Wellbutrin  MG 24 hr tablet Take 150 mg by mouth in the morning       No current facility-administered medications for this visit.         Health Maintenance     Health Maintenance   Topic Date Due    PT PLAN OF CARE  07/25/2019    Annual Physical  09/30/2023    Influenza Vaccine (1) 09/01/2024    COVID-19 Vaccine (4 - 2024-25 season) 09/01/2024    Depression Screening  03/10/2026    DTaP,Tdap,and Td Vaccines (3 - Td or Tdap) 02/10/2031    Colorectal Cancer Screening  05/28/2033    Zoster Vaccine (1 of 2) 10/12/2033    HIV Screening  Completed    Hepatitis C Screening  Completed    Meningococcal B Vaccine  Aged Out    RSV Vaccine age 0-20 Months  Aged Out    Pneumococcal Vaccine: Pediatrics (0 to 5 Years) and At-Risk Patients (6 to 64 Years)  Aged Out    HIB Vaccine  Aged Out    IPV Vaccine  Aged Out    Hepatitis A Vaccine  Aged Out    Meningococcal ACWY Vaccine  Aged Out    HPV Vaccine  Aged Out     Immunization History   Administered Date(s) Administered    COVID-19 PFIZER VACCINE 0.3 ML IM 12/21/2020, 01/11/2021, 10/12/2021    INFLUENZA 11/19/2021    Meningococcal Polysaccharide (MPSV4) 07/17/2002    Td (adult), adsorbed 11/09/1998    Tdap 02/10/2021       Kevin Torres MD

## 2025-03-12 ENCOUNTER — HOSPITAL ENCOUNTER (OUTPATIENT)
Dept: RADIOLOGY | Facility: CLINIC | Age: 40
Discharge: HOME | End: 2025-03-12
Payer: COMMERCIAL

## 2025-03-12 ENCOUNTER — TELEPHONE (OUTPATIENT)
Dept: PAIN MEDICINE | Facility: CLINIC | Age: 40
End: 2025-03-12

## 2025-03-12 VITALS
RESPIRATION RATE: 16 BRPM | TEMPERATURE: 98.2 F | DIASTOLIC BLOOD PRESSURE: 81 MMHG | SYSTOLIC BLOOD PRESSURE: 122 MMHG | HEART RATE: 80 BPM | OXYGEN SATURATION: 98 %

## 2025-03-12 DIAGNOSIS — M47.816 LUMBAR SPONDYLOSIS: ICD-10-CM

## 2025-03-12 PROCEDURE — 64635 DESTROY LUMB/SAC FACET JNT: CPT | Performed by: ANESTHESIOLOGY

## 2025-03-12 PROCEDURE — 64636 DESTROY L/S FACET JNT ADDL: CPT | Performed by: ANESTHESIOLOGY

## 2025-03-12 RX ORDER — METHYLPREDNISOLONE ACETATE 80 MG/ML
80 INJECTION, SUSPENSION INTRA-ARTICULAR; INTRALESIONAL; INTRAMUSCULAR; PARENTERAL; SOFT TISSUE ONCE
Status: COMPLETED | OUTPATIENT
Start: 2025-03-12 | End: 2025-03-12

## 2025-03-12 RX ORDER — BUPIVACAINE HCL/PF 2.5 MG/ML
10 VIAL (ML) INJECTION ONCE
Status: COMPLETED | OUTPATIENT
Start: 2025-03-12 | End: 2025-03-12

## 2025-03-12 RX ORDER — LIDOCAINE HYDROCHLORIDE 10 MG/ML
30 INJECTION, SOLUTION EPIDURAL; INFILTRATION; INTRACAUDAL; PERINEURAL ONCE
Status: COMPLETED | OUTPATIENT
Start: 2025-03-12 | End: 2025-03-12

## 2025-03-12 RX ADMIN — LIDOCAINE HYDROCHLORIDE 18 ML: 10 INJECTION, SOLUTION EPIDURAL; INFILTRATION; INTRACAUDAL; PERINEURAL at 13:10

## 2025-03-12 RX ADMIN — BUPIVACAINE HYDROCHLORIDE 3 ML: 2.5 INJECTION, SOLUTION EPIDURAL; INFILTRATION; INTRACAUDAL at 13:22

## 2025-03-12 RX ADMIN — METHYLPREDNISOLONE ACETATE 20 MG: 80 INJECTION, SUSPENSION INTRA-ARTICULAR; INTRALESIONAL; INTRAMUSCULAR; SOFT TISSUE at 13:22

## 2025-03-12 NOTE — DISCHARGE INSTR - LAB

## 2025-03-12 NOTE — H&P
History of Present Illness: The patient is a 39 y.o. male who presents with complaints of left lower back pain is here today for left L3-5 ablation    Past Medical History:   Diagnosis Date    Asthma     Esophageal reflux     GERD (gastroesophageal reflux disease)     HL (hearing loss)     Migraine     Tinnitus        Past Surgical History:   Procedure Laterality Date    CYST REMOVAL Right     index finger    WISDOM TOOTH EXTRACTION           Current Outpatient Medications:     albuterol (PROVENTIL HFA,VENTOLIN HFA) 90 mcg/act inhaler, Inhale 2 puffs every 4 (four) hours as needed (as needed), Disp: 1 Inhaler, Rfl: 3    escitalopram (LEXAPRO) 5 mg tablet, , Disp: , Rfl:     finasteride (PROPECIA) 1 MG tablet, Take 1 tablet (1 mg total) by mouth daily, Disp: 90 tablet, Rfl: 2    methocarbamol (ROBAXIN) 750 mg tablet, Take 1 tablet (750 mg total) by mouth 2 (two) times a day as needed for muscle spasms, Disp: 60 tablet, Rfl: 1    multivitamin (THERAGRAN) TABS, Take 1 tablet by mouth daily, Disp: , Rfl:     pantoprazole (PROTONIX) 40 mg tablet, Take 1 tablet (40 mg total) by mouth daily, Disp: 90 tablet, Rfl: 3    Wellbutrin  MG 24 hr tablet, Take 150 mg by mouth in the morning, Disp: , Rfl:     Current Facility-Administered Medications:     bupivacaine (PF) (MARCAINE) 0.25 % injection 10 mL, 10 mL, Perineural, Once, John Paul Motta MD    lidocaine (PF) (XYLOCAINE-MPF) 1 % injection 30 mL, 30 mL, Infiltration, Once, John Paul Motta MD    methylPREDNISolone acetate (DEPO-MEDROL) injection 80 mg, 80 mg, Perineural, Once, John Paul Motta MD    No Known Allergies    Physical Exam:   Vitals:    03/12/25 1301   BP: 117/77   Pulse: 65   Resp: 16   Temp: 98.2 °F (36.8 °C)   SpO2: (!) 63%     General: Awake, Alert, Oriented x 3, Mood and affect appropriate  Respiratory: Respirations even and unlabored  Cardiovascular: Peripheral pulses intact; no edema  Musculoskeletal Exam: Left lower back pain    ASA Score:  3    Patient/Chart Verification  Patient ID Verified: Verbal  ID Band Applied: No  Consents Confirmed: To be obtained in the Procedural area  H&P( within 30 days) Verified: To be obtained in the Procedural area  Interval H&P(within 24 hr) Complete (required for Outpatients and Surgery Admit only): To be obtained in the Procedural area  Allergies Reviewed: Yes  Anticoag/NSAID held?: NA  Currently on antibiotics?: No    Assessment:   1. Lumbar spondylosis        Plan: Left L3-L5 RFA

## 2025-03-13 NOTE — TELEPHONE ENCOUNTER
ROGERIO  S/w Pt. Pt stated current pain level is 2/10. Pt states needle sites look good, denies S&S of infection, denies fevers, C/o mild soreness. Advised Pt if does have pain to take prescribed or OTC pain medications and/or use ice/heat and that it takes 4 to 6 weeks to see the full effect. Scheduled F/u appt w/ AS. Pt verbalized understanding.

## 2025-03-21 ENCOUNTER — PATIENT MESSAGE (OUTPATIENT)
Dept: FAMILY MEDICINE CLINIC | Facility: CLINIC | Age: 40
End: 2025-03-21

## 2025-04-01 ENCOUNTER — RESULTS FOLLOW-UP (OUTPATIENT)
Dept: FAMILY MEDICINE CLINIC | Facility: CLINIC | Age: 40
End: 2025-04-01

## 2025-04-01 LAB
ALBUMIN SERPL-MCNC: 4.8 G/DL (ref 3.5–5.7)
ALP SERPL-CCNC: 67 U/L (ref 35–120)
ALT SERPL-CCNC: 29 U/L
ANION GAP SERPL CALCULATED.3IONS-SCNC: 8 MMOL/L (ref 3–11)
AST SERPL-CCNC: 23 U/L
BILIRUB SERPL-MCNC: 0.5 MG/DL (ref 0.2–1)
BUN SERPL-MCNC: 20 MG/DL (ref 7–28)
CALCIUM SERPL-MCNC: 9.1 MG/DL (ref 8.5–10.5)
CHLORIDE SERPL-SCNC: 103 MMOL/L (ref 100–109)
CHOLEST SERPL-MCNC: 176 MG/DL
CHOLEST/HDLC SERPL: 4 {RATIO}
CO2 SERPL-SCNC: 31 MMOL/L (ref 21–31)
CREAT SERPL-MCNC: 1.18 MG/DL (ref 0.53–1.3)
CYTOLOGY CMNT CVX/VAG CYTO-IMP: NORMAL
ERYTHROCYTE [DISTWIDTH] IN BLOOD BY AUTOMATED COUNT: 13.1 % (ref 12–16)
GFR/BSA.PRED SERPLBLD CYS-BASED-ARV: 79 ML/MIN/{1.73_M2}
GLUCOSE SERPL-MCNC: 84 MG/DL (ref 65–99)
HCT VFR BLD AUTO: 43.5 % (ref 37–48)
HDLC SERPL-MCNC: 44 MG/DL (ref 23–92)
HGB BLD-MCNC: 15.5 G/DL (ref 12.5–17)
LDLC SERPL CALC-MCNC: 107 MG/DL
MCH RBC QN AUTO: 31.6 PG (ref 27–36)
MCHC RBC AUTO-ENTMCNC: 35.7 G/DL (ref 32–37)
MCV RBC AUTO: 89 FL (ref 80–100)
NONHDLC SERPL-MCNC: 132 MG/DL
PLATELET # BLD AUTO: 191 THOU/CMM (ref 140–350)
PMV BLD REES-ECKER: 7.7 FL (ref 7.5–11.3)
POTASSIUM SERPL-SCNC: 4 MMOL/L (ref 3.5–5.2)
PROT SERPL-MCNC: 7.1 G/DL (ref 6.3–8.3)
RBC # BLD AUTO: 4.91 MILL/CMM (ref 4–5.4)
SHBG SERPL-SCNC: 22.2 NMOL/L (ref 13.3–89.5)
SODIUM SERPL-SCNC: 142 MMOL/L (ref 135–145)
TESTOST FREE MFR SERPL: 2.5 % (ref 1.5–3.2)
TESTOST FREE SERPL-MCNC: 91.8 PG/ML (ref 21–135)
TESTOST SERPL-MCNC: 370 NG/DL (ref 198–679)
TESTOSTERONE.FREE+WB MFR SERPL: 58.2 %
TESTOSTERONE.FREE+WB SERPL-MCNC: 215 NG/DL (ref 48–317)
TRIGL SERPL-MCNC: 125 MG/DL
TSH SERPL-ACNC: 3.24 UIU/ML (ref 0.45–5.33)
WBC # BLD AUTO: 7.5 THOU/CMM (ref 4–10.5)

## 2025-05-12 ENCOUNTER — OFFICE VISIT (OUTPATIENT)
Dept: PAIN MEDICINE | Facility: CLINIC | Age: 40
End: 2025-05-12
Payer: COMMERCIAL

## 2025-05-12 DIAGNOSIS — M79.18 MYOFASCIAL PAIN SYNDROME: ICD-10-CM

## 2025-05-12 DIAGNOSIS — M47.816 LUMBAR SPONDYLOSIS: Primary | ICD-10-CM

## 2025-05-12 DIAGNOSIS — G89.4 CHRONIC PAIN SYNDROME: ICD-10-CM

## 2025-05-12 PROCEDURE — 99213 OFFICE O/P EST LOW 20 MIN: CPT

## 2025-05-12 NOTE — PROGRESS NOTES
Name: Robin Osborn Jr.    : 1985      MRN: [id number]  Encounter Provider: CHANG Cummings  Encounter Date: 2025  Encounter department: [facility]    Assessment:  1. Lumbar spondylosis  2. Chronic pain syndrome  3. Myofascial pain syndrome      Plan:  The patient is a 39-year-old male with a history of chronic pain secondary to low back pain, lumbar spondylosis and myofascial pain who presents to the office with improved left-sided low back pain following a left-sided L3-5 radiofrequency ablation done on 3/12/2025 which is providing him 50% relief of his pain.    I did instruct patient he may receive 1 to 2 years relief following this procedure and that this procedure can be repeated in the future if his low back pain were to worsen.  For now, we will follow-up as needed, patient was instructed to call our office if pain worsens or any new pain complaint arises.    My impressions and treatment recommendations were discussed in detail with the patient who verbalized understanding and had no further questions.  Discharge instructions were provided. I personally saw and examined the patient and I agree with the above discussed plan of care.    Follow-up is planned as needed or sooner as warranted.  Discharge instructions were provided.     No orders of the defined types were placed in this encounter.    No orders of the defined types were placed in this encounter.      History of Present Illness:  Robin Osborn Jr. is a 39 y.o. male with a history of chronic pain secondary to low back pain, lumbar spondylosis and myofascial pain.  He was last seen on 3/12/2025 where he underwent a left-sided L3-5 radiofrequency ablation which is providing him 50% relief of his pain.  He presents to the office with improved but ongoing left-sided low back pain.    He states his pain is better since the last office visit and constant.  He states his pain is worse with sitting and lying it  does improve with activity.  He rates the quality of his pain as dull/aching and is currently rating his pain a 2/10 on a numeric scale.    Current pain medications include methocarbamol as needed which he takes sparingly and is helpful.                 I have personally reviewed and/or updated the patient's past medical history, past surgical history, family history, social history, current medications, allergies, and vital signs today.     Review of Systems:    Review of Systems   Respiratory:  Negative for shortness of breath.    Cardiovascular:  Negative for chest pain.   Gastrointestinal:  Negative for constipation, diarrhea, nausea and vomiting.   Musculoskeletal:  Positive for back pain. Negative for arthralgias, gait problem, joint swelling and myalgias.   Skin:  Negative for rash.   Neurological:  Negative for dizziness, seizures and weakness.   All other systems reviewed and are negative.      Patient Active Problem List   Diagnosis    Chronic low back pain    Disc degeneration, lumbar    Lumbar disc herniation    Alopecia    Intervertebral disc disorder with radiculopathy of lumbar region    Patellofemoral dysfunction of left knee    Internal derangement of left knee    Anxiety    Other headache syndrome    Lumbar spondylosis    Folliculitis    Need for influenza vaccination    Esophageal reflux    Diarrhea    Dermatitis    Annual physical exam    Chronic fatigue       Past Medical History:   Diagnosis Date    Asthma     Esophageal reflux     GERD (gastroesophageal reflux disease)     HL (hearing loss)     Migraine     Tinnitus        Past Surgical History:   Procedure Laterality Date    CYST REMOVAL Right     index finger    WISDOM TOOTH EXTRACTION         Family History   Problem Relation Age of Onset    No Known Problems Mother     Hyperlipidemia Father     Hypertension Father     Colon cancer Maternal Aunt     Skin cancer Maternal Grandmother     Cancer Maternal Grandfather     Skin cancer Maternal  Grandfather     Hypertension Paternal Grandmother     Hypertension Paternal Grandfather     Skin cancer Paternal Grandfather        Social History     Occupational History    Not on file   Tobacco Use    Smoking status: Never    Smokeless tobacco: Never   Vaping Use    Vaping status: Never Used   Substance and Sexual Activity    Alcohol use: Yes     Comment: social    Drug use: No    Sexual activity: Yes       Current Outpatient Medications on File Prior to Visit   Medication Sig    albuterol (PROVENTIL HFA,VENTOLIN HFA) 90 mcg/act inhaler Inhale 2 puffs every 4 (four) hours as needed (as needed)    escitalopram (LEXAPRO) 5 mg tablet     finasteride (PROPECIA) 1 MG tablet Take 1 tablet (1 mg total) by mouth daily    methocarbamol (ROBAXIN) 750 mg tablet Take 1 tablet (750 mg total) by mouth 2 (two) times a day as needed for muscle spasms    multivitamin (THERAGRAN) TABS Take 1 tablet by mouth daily    pantoprazole (PROTONIX) 40 mg tablet Take 1 tablet (40 mg total) by mouth daily    Wellbutrin  MG 24 hr tablet Take 150 mg by mouth in the morning     No current facility-administered medications on file prior to visit.       No Known Allergies    Physical Exam:    There were no vitals taken for this visit.    Constitutional:normal, well developed, well nourished, alert, in no distress and non-toxic and no overt pain behavior.  Eyes:anicteric  HEENT:grossly intact  Neck:supple, symmetric, trachea midline and no masses   Pulmonary:even and unlabored  Cardiovascular:No edema or pitting edema present  Skin:Normal without rashes or lesions and well hydrated  Psychiatric:Mood and affect appropriate  Neurologic:Cranial Nerves II-XII grossly intact  Musculoskeletal: normal gait      This document was created using speech voice recognition software. Grammatical errors, random word insertions, pronoun errors, and incomplete sentences are an occasional consequence of this system due to software limitations, ambient noise,  and hardware issues. Any formal questions or concerns about content, text, or information contained within the body of this dictation should be directly addressed to the provider for clarification.